# Patient Record
Sex: FEMALE | Race: WHITE | NOT HISPANIC OR LATINO | URBAN - METROPOLITAN AREA
[De-identification: names, ages, dates, MRNs, and addresses within clinical notes are randomized per-mention and may not be internally consistent; named-entity substitution may affect disease eponyms.]

---

## 2019-12-27 ENCOUNTER — HOSPITAL ENCOUNTER (EMERGENCY)
Facility: HOSPITAL | Age: 34
Discharge: LEFT BEFORE DISCHARGE | End: 2019-12-27

## 2023-05-09 ENCOUNTER — OFFICE VISIT (OUTPATIENT)
Dept: INTERNAL MEDICINE | Facility: CLINIC | Age: 38
End: 2023-05-09
Payer: COMMERCIAL

## 2023-05-09 VITALS
BODY MASS INDEX: 31.8 KG/M2 | HEART RATE: 82 BPM | HEIGHT: 63 IN | DIASTOLIC BLOOD PRESSURE: 76 MMHG | SYSTOLIC BLOOD PRESSURE: 136 MMHG | TEMPERATURE: 97.5 F | OXYGEN SATURATION: 94 % | WEIGHT: 179.5 LBS

## 2023-05-09 DIAGNOSIS — Z13.220 SCREENING FOR LIPID DISORDERS: ICD-10-CM

## 2023-05-09 DIAGNOSIS — E66.9 CLASS 1 OBESITY WITH SERIOUS COMORBIDITY AND BODY MASS INDEX (BMI) OF 31.0 TO 31.9 IN ADULT, UNSPECIFIED OBESITY TYPE: ICD-10-CM

## 2023-05-09 DIAGNOSIS — F33.0 MILD EPISODE OF RECURRENT MAJOR DEPRESSIVE DISORDER: Primary | ICD-10-CM

## 2023-05-09 DIAGNOSIS — F41.1 GAD (GENERALIZED ANXIETY DISORDER): ICD-10-CM

## 2023-05-09 DIAGNOSIS — R60.0 LOWER EXTREMITY EDEMA: ICD-10-CM

## 2023-05-09 DIAGNOSIS — Z11.59 NEED FOR HEPATITIS C SCREENING TEST: ICD-10-CM

## 2023-05-09 LAB
ALBUMIN SERPL-MCNC: 4.4 G/DL (ref 3.5–5.2)
ALBUMIN/GLOB SERPL: 1.6 G/DL
ALP SERPL-CCNC: 60 U/L (ref 39–117)
ALT SERPL W P-5'-P-CCNC: 13 U/L (ref 1–33)
ANION GAP SERPL CALCULATED.3IONS-SCNC: 8.8 MMOL/L (ref 5–15)
AST SERPL-CCNC: 14 U/L (ref 1–32)
BASOPHILS # BLD AUTO: 0.05 10*3/MM3 (ref 0–0.2)
BASOPHILS NFR BLD AUTO: 0.7 % (ref 0–1.5)
BILIRUB SERPL-MCNC: <0.2 MG/DL (ref 0–1.2)
BUN SERPL-MCNC: 16 MG/DL (ref 6–20)
BUN/CREAT SERPL: 29.1 (ref 7–25)
CALCIUM SPEC-SCNC: 9.9 MG/DL (ref 8.6–10.5)
CHLORIDE SERPL-SCNC: 108 MMOL/L (ref 98–107)
CHOLEST SERPL-MCNC: 140 MG/DL (ref 0–200)
CO2 SERPL-SCNC: 24.2 MMOL/L (ref 22–29)
CREAT SERPL-MCNC: 0.55 MG/DL (ref 0.57–1)
DEPRECATED RDW RBC AUTO: 43.1 FL (ref 37–54)
EGFRCR SERPLBLD CKD-EPI 2021: 121.2 ML/MIN/1.73
EOSINOPHIL # BLD AUTO: 0.47 10*3/MM3 (ref 0–0.4)
EOSINOPHIL NFR BLD AUTO: 6.8 % (ref 0.3–6.2)
ERYTHROCYTE [DISTWIDTH] IN BLOOD BY AUTOMATED COUNT: 12.7 % (ref 12.3–15.4)
GLOBULIN UR ELPH-MCNC: 2.7 GM/DL
GLUCOSE SERPL-MCNC: 94 MG/DL (ref 65–99)
HCT VFR BLD AUTO: 36.4 % (ref 34–46.6)
HDLC SERPL-MCNC: 44 MG/DL (ref 40–60)
HGB BLD-MCNC: 11.9 G/DL (ref 12–15.9)
IMM GRANULOCYTES # BLD AUTO: 0.02 10*3/MM3 (ref 0–0.05)
IMM GRANULOCYTES NFR BLD AUTO: 0.3 % (ref 0–0.5)
LDLC SERPL CALC-MCNC: 83 MG/DL (ref 0–100)
LDLC/HDLC SERPL: 1.9 {RATIO}
LYMPHOCYTES # BLD AUTO: 2.79 10*3/MM3 (ref 0.7–3.1)
LYMPHOCYTES NFR BLD AUTO: 40.6 % (ref 19.6–45.3)
MCH RBC QN AUTO: 30.2 PG (ref 26.6–33)
MCHC RBC AUTO-ENTMCNC: 32.7 G/DL (ref 31.5–35.7)
MCV RBC AUTO: 92.4 FL (ref 79–97)
MONOCYTES # BLD AUTO: 0.41 10*3/MM3 (ref 0.1–0.9)
MONOCYTES NFR BLD AUTO: 6 % (ref 5–12)
NEUTROPHILS NFR BLD AUTO: 3.14 10*3/MM3 (ref 1.7–7)
NEUTROPHILS NFR BLD AUTO: 45.6 % (ref 42.7–76)
NRBC BLD AUTO-RTO: 0 /100 WBC (ref 0–0.2)
PLATELET # BLD AUTO: 249 10*3/MM3 (ref 140–450)
PMV BLD AUTO: 10.8 FL (ref 6–12)
POTASSIUM SERPL-SCNC: 4.3 MMOL/L (ref 3.5–5.2)
PROT SERPL-MCNC: 7.1 G/DL (ref 6–8.5)
RBC # BLD AUTO: 3.94 10*6/MM3 (ref 3.77–5.28)
SODIUM SERPL-SCNC: 141 MMOL/L (ref 136–145)
TRIGL SERPL-MCNC: 62 MG/DL (ref 0–150)
TSH SERPL DL<=0.05 MIU/L-ACNC: 3.46 UIU/ML (ref 0.27–4.2)
VLDLC SERPL-MCNC: 13 MG/DL (ref 5–40)
WBC NRBC COR # BLD: 6.88 10*3/MM3 (ref 3.4–10.8)

## 2023-05-09 PROCEDURE — 80053 COMPREHEN METABOLIC PANEL: CPT | Performed by: NURSE PRACTITIONER

## 2023-05-09 PROCEDURE — 85025 COMPLETE CBC W/AUTO DIFF WBC: CPT | Performed by: NURSE PRACTITIONER

## 2023-05-09 PROCEDURE — 84443 ASSAY THYROID STIM HORMONE: CPT | Performed by: NURSE PRACTITIONER

## 2023-05-09 PROCEDURE — 80061 LIPID PANEL: CPT | Performed by: NURSE PRACTITIONER

## 2023-05-09 PROCEDURE — 86803 HEPATITIS C AB TEST: CPT | Performed by: NURSE PRACTITIONER

## 2023-05-09 RX ORDER — BUPRENORPHINE HYDROCHLORIDE AND NALOXONE HYDROCHLORIDE DIHYDRATE 8; 2 MG/1; MG/1
2 TABLET SUBLINGUAL DAILY
COMMUNITY
Start: 2023-04-28

## 2023-05-09 RX ORDER — FLUOXETINE HYDROCHLORIDE 40 MG/1
40 CAPSULE ORAL DAILY
COMMUNITY
Start: 2023-04-03

## 2023-05-09 RX ORDER — BUSPIRONE HYDROCHLORIDE 5 MG/1
5 TABLET ORAL 3 TIMES DAILY
Qty: 90 TABLET | Refills: 1 | Status: SHIPPED | OUTPATIENT
Start: 2023-05-09

## 2023-05-09 RX ORDER — HYDROCHLOROTHIAZIDE 25 MG/1
25 TABLET ORAL DAILY
Qty: 90 TABLET | Refills: 1 | Status: SHIPPED | OUTPATIENT
Start: 2023-05-09

## 2023-05-09 NOTE — PROGRESS NOTES
Chief Complaint  Establish Care, Leg Swelling (Has had varicose veins show up, has had US for possible DVT prior, has gotten worse), Weight Loss, Nicotine Dependence (Wanting to stop smoking, has tried patches with no relief ), Depression, and Anxiety (Has had bad postpartum )    Subjective          Nazia Romero presents to [facility]  Obesity  This is a chronic problem. Pertinent negatives include no abdominal pain, anorexia, arthralgias, change in bowel habit, chest pain, chills, congestion, coughing, diaphoresis, fatigue, fever, headaches, joint swelling, myalgias, nausea, neck pain, numbness, rash, sore throat, swollen glands, urinary symptoms, vertigo, visual change, vomiting or weakness.   Depression  Visit Type: initial  Patient presents with the following symptoms: decreased concentration, depressed mood, excessive worry, fatigue, feelings of hopelessness, insomnia, irritability and nervousness/anxiety.  Patient is not experiencing: anhedonia, chest pain, choking sensation, compulsions, confusion, dizziness, dry mouth, feelings of worthlessness, hypersomnia, hyperventilation, impotence, malaise, memory impairment, muscle tension, nausea, obsessions, palpitations, panic, psychomotor agitation, psychomotor retardation, restlessness, shortness of breath, suicidal ideas, suicidal planning, thoughts of death, weight gain and weight loss.    Anxiety  Presents for initial visit. Symptoms include decreased concentration, depressed mood, excessive worry, insomnia, irritability and nervous/anxious behavior. Patient reports no chest pain, compulsions, confusion, dizziness, dry mouth, feeling of choking, hyperventilation, impotence, malaise, muscle tension, nausea, obsessions, palpitations, panic, restlessness, shortness of breath or suicidal ideas.     Her past medical history is significant for depression.   Nicotine Dependence  Symptoms include decreased  "concentration, insomnia and irritability. Symptoms are negative for fatigue and sore throat.       Previous PCP: [facility], [facility]  Specialist(s): none currently   COVID vaccine: completed first dose  Pneumonia vaccine: not of age  Shingles vaccine: not of age  Colon cancer screening: never, no FHx of colon cancer  Mammogram: never, does have FHx of breast cancer, distant cousin on grandmothers side  Pap Smear: had one about 6 months ago    2.5 years.   Clean - pain pills. Oxycodone.   Am in [place]. Dr. Guerra     1-1.5 ppd 15 years           Current Outpatient Medications   Medication Instructions   • buprenorphine-naloxone (SUBOXONE) 8-2 MG per SL tablet 2 tablets, Sublingual, Daily   • busPIRone (BUSPAR) 5 mg, Oral, 3 Times Daily   • FLUoxetine (PROZAC) 40 mg, Oral, Daily   • hydroCHLOROthiazide (HYDRODIURIL) 25 mg, Oral, Daily       The following portions of the patient's history were reviewed and updated as appropriate: allergies, current medications, past family history, past medical history, past social history, past surgical history, and problem list.    Objective   Vital Signs:   /76 (BP Location: Right arm, Patient Position: Sitting, Cuff Size: Adult)   Pulse 82   Temp 97.5 °F (36.4 °C) (Temporal)   Ht 160 cm (63\")   Wt 81.4 kg (179 lb 8 oz)   SpO2 94%   BMI 31.80 kg/m²     Wt Readings from Last 3 Encounters:   05/09/23 81.4 kg (179 lb 8 oz)     BP Readings from Last 3 Encounters:   05/09/23 136/76     Physical Exam  Constitutional:       Appearance: She is obese.        Appearance: No acute distress, well-nourished  Head: normocephalic, atraumatic  Eyes: extraocular movements intact, no scleral icterus, no conjunctival injection  Ears, Nose, and Throat: external ears normal, nares patent, moist mucous membranes  Cardiovascular: regular rate and rhythm. no murmurs, rubs, or gallops. no edema  Respiratory: breathing comfortably, symmetric chest rise, clear to auscultation " bilaterally. No wheezes, rales, or rhonchi.  Neuro: alert and oriented to time, place, and person. Normal gait  Psych: normal mood and affect     Result Review :   The following data was reviewed by: TERA Mcmahan on 05/09/2023:  Common labs        11/10/2022    01:30 3/30/2023    15:47 5/9/2023    10:11   Common Labs   Glucose   94     BUN   16     Creatinine   0.55     Sodium   141     Potassium   4.3     Chloride   108     Calcium   9.9     Albumin   4.4     Total Bilirubin   <0.2     Alkaline Phosphatase   60     AST (SGOT)   14     ALT (SGPT)   13     WBC 8.44      6.09      6.88     Hemoglobin 11.7      11.6      11.9     Hematocrit 35.7      36.0      36.4     Platelets 265      227      249     Total Cholesterol   140     Triglycerides   62     HDL Cholesterol   44     LDL Cholesterol    83         This result is from an external source.            Lab Results   Component Value Date    STREPAAG NEGATIVE CULTURE TO FOLLOW 01/27/2017    INR 0.9 11/10/2022    BILIRUBINUR NEGATIVE 01/27/2017       Procedures        Assessment and Plan    Diagnoses and all orders for this visit:    1. Mild episode of recurrent major depressive disorder (Primary)  -     busPIRone (BUSPAR) 5 MG tablet; Take 1 tablet by mouth 3 (Three) Times a Day.  Dispense: 90 tablet; Refill: 1  -     TSH Rfx On Abnormal To Free T4  -     Comprehensive Metabolic Panel  -     CBC & Differential    2. NATALI (generalized anxiety disorder)  -     busPIRone (BUSPAR) 5 MG tablet; Take 1 tablet by mouth 3 (Three) Times a Day.  Dispense: 90 tablet; Refill: 1  -     TSH Rfx On Abnormal To Free T4  -     Comprehensive Metabolic Panel  -     CBC & Differential    3. Screening for lipid disorders  -     Lipid Panel    4. Need for hepatitis C screening test  -     HCV Antibody Rfx To Qnt PCR  -     Interpretation:    5. Lower extremity edema  -     hydroCHLOROthiazide (HYDRODIURIL) 25 MG tablet; Take 1 tablet by mouth Daily.  Dispense: 90 tablet;  Refill: 1    6. Class 1 obesity with serious comorbidity and body mass index (BMI) of 31.0 to 31.9 in adult, unspecified obesity type          There are no discontinued medications.       Follow Up   Return in about 4 weeks (around 6/6/2023) for Anxiety.  Patient was given instructions and counseling regarding her condition or for health maintenance advice. Please see specific information pulled into the AVS if appropriate.       Génesis Tapia, TERA  05/12/23  15:14 EDT

## 2023-05-11 LAB
HCV AB SERPL QL IA: NORMAL
HCV IGG SERPL QL IA: NON REACTIVE

## 2023-05-12 PROBLEM — F41.1 GAD (GENERALIZED ANXIETY DISORDER): Status: ACTIVE | Noted: 2023-05-12

## 2023-05-12 PROBLEM — E66.9 CLASS 1 OBESITY WITH SERIOUS COMORBIDITY AND BODY MASS INDEX (BMI) OF 31.0 TO 31.9 IN ADULT: Status: ACTIVE | Noted: 2023-05-12

## 2023-05-12 PROBLEM — R60.0 LOWER EXTREMITY EDEMA: Status: ACTIVE | Noted: 2023-05-12

## 2023-05-12 PROBLEM — F33.0 MILD EPISODE OF RECURRENT MAJOR DEPRESSIVE DISORDER: Status: ACTIVE | Noted: 2023-05-12

## 2023-05-12 PROBLEM — E66.811 CLASS 1 OBESITY WITH SERIOUS COMORBIDITY AND BODY MASS INDEX (BMI) OF 31.0 TO 31.9 IN ADULT: Status: ACTIVE | Noted: 2023-05-12

## 2023-05-12 NOTE — ASSESSMENT & PLAN NOTE
Patient's depression is single episode and is mild without psychosis. Their depression is currently active and the condition is newly identified. This will be reassessed in 4 weeks. F/U as described:patient was prescribed an antidepressant medicine.

## 2023-05-12 NOTE — ASSESSMENT & PLAN NOTE
Psychological condition is newly identified.  Regular aerobic exercise.  Medication changes per orders.  Psychological condition  will be reassessed in 4 weeks.

## 2023-05-12 NOTE — ASSESSMENT & PLAN NOTE
Patient's (Body mass index is 31.8 kg/m².) indicates that they are obese (BMI >30) with health conditions that include none . Weight is newly identified. BMI  is above average; BMI management plan is completed. We discussed low calorie, low carb based diet program, portion control and increasing exercise.

## 2023-05-16 ENCOUNTER — TELEPHONE (OUTPATIENT)
Dept: INTERNAL MEDICINE | Facility: CLINIC | Age: 38
End: 2023-05-16
Payer: COMMERCIAL

## 2023-05-16 NOTE — TELEPHONE ENCOUNTER
----- Message from TERA Mcmahan sent at 5/16/2023 12:48 PM EDT -----  Slight anemia noted.   Would like to see iron drawn. Placed outpatient visit.

## 2023-05-23 ENCOUNTER — OFFICE VISIT (OUTPATIENT)
Dept: INTERNAL MEDICINE | Facility: CLINIC | Age: 38
End: 2023-05-23
Payer: COMMERCIAL

## 2023-05-23 VITALS
HEIGHT: 63 IN | BODY MASS INDEX: 31.36 KG/M2 | DIASTOLIC BLOOD PRESSURE: 85 MMHG | SYSTOLIC BLOOD PRESSURE: 138 MMHG | WEIGHT: 177 LBS | OXYGEN SATURATION: 97 % | TEMPERATURE: 98.7 F | HEART RATE: 75 BPM

## 2023-05-23 DIAGNOSIS — J01.00 ACUTE NON-RECURRENT MAXILLARY SINUSITIS: Primary | ICD-10-CM

## 2023-05-23 PROCEDURE — 1159F MED LIST DOCD IN RCRD: CPT | Performed by: INTERNAL MEDICINE

## 2023-05-23 PROCEDURE — 1160F RVW MEDS BY RX/DR IN RCRD: CPT | Performed by: INTERNAL MEDICINE

## 2023-05-23 PROCEDURE — 99213 OFFICE O/P EST LOW 20 MIN: CPT | Performed by: INTERNAL MEDICINE

## 2023-05-23 RX ORDER — PREDNISONE 20 MG/1
40 TABLET ORAL DAILY
Qty: 10 TABLET | Refills: 0 | Status: SHIPPED | OUTPATIENT
Start: 2023-05-23 | End: 2023-05-28

## 2023-05-23 RX ORDER — DOXYCYCLINE HYCLATE 100 MG/1
100 CAPSULE ORAL 2 TIMES DAILY
Qty: 14 CAPSULE | Refills: 0 | Status: SHIPPED | OUTPATIENT
Start: 2023-05-23 | End: 2023-05-30

## 2023-05-23 RX ORDER — POLYETHYLENE GLYCOL 3350 17 G/DOSE
POWDER (GRAM) ORAL
COMMUNITY
Start: 2023-05-11

## 2023-05-23 NOTE — PROGRESS NOTES
"Chief Complaint  Eye Drainage (Wake up in the morning swelling, and a lot of drainage /Feels like eyes are watering )    Subjective          Nazia Romero presents to [facility]  History of Present Illness  Patient with cough, congestion, rhinorrhea, myalgia. Patient denies fevers, shortness of breath, CP. Patient with eye drainage that developed last 3-4 days as well. Patient without vomiting and diarrhea. She reports her children were sick a few weeks ago.  Patient reports sick with strep approx 2 months ago.    Current Outpatient Medications   Medication Instructions   • buprenorphine-naloxone (SUBOXONE) 8-2 MG per SL tablet 2 tablets, Sublingual, Daily   • busPIRone (BUSPAR) 5 mg, Oral, 3 Times Daily   • [employer] Purelax 17 GM/SCOOP powder DISSOLVE 17 GRAMS INTO APPROPRIATE LIQUID AND DRINK BY MOUTH DAILY   • doxycycline (VIBRAMYCIN) 100 mg, Oral, 2 Times Daily   • FLUoxetine (PROZAC) 40 mg, Oral, Daily   • hydroCHLOROthiazide (HYDRODIURIL) 25 mg, Oral, Daily   • predniSONE (DELTASONE) 40 mg, Oral, Daily       The following portions of the patient's history were reviewed and updated as appropriate: allergies, current medications, past family history, past medical history, past social history, past surgical history, and problem list.    Objective   Vital Signs:   /85 (BP Location: Left arm)   Pulse 75   Temp 98.7 °F (37.1 °C) (Temporal)   Ht 160 cm (63\")   Wt 80.3 kg (177 lb)   SpO2 97%   BMI 31.35 kg/m²     Wt Readings from Last 3 Encounters:   05/23/23 80.3 kg (177 lb)   05/09/23 81.4 kg (179 lb 8 oz)     BP Readings from Last 3 Encounters:   05/23/23 138/85   05/09/23 136/76     Physical Exam   Appearance: No acute distress, well-nourished  Head: normocephalic, atraumatic  Eyes: extraocular movements intact, no scleral icterus, no conjunctival injection  Ears, Nose, and Throat: external ears normal, nares patent, moist mucous membranes. TMs with effusion " remington.   Cardiovascular: regular rate and rhythm. no murmurs, rubs, or gallops. no edema  Respiratory: breathing comfortably, symmetric chest rise, clear to auscultation bilaterally. No wheezes, rales, or rhonchi.  Neuro: alert and oriented to time, place, and person. Normal gait  Psych: normal mood and affect     Result Review :   The following data was reviewed by: Prudencio Layne Jr, MD on 05/23/2023:  Common labs        11/10/2022    01:30 3/30/2023    15:47 5/9/2023    10:11   Common Labs   Glucose   94     BUN   16     Creatinine   0.55     Sodium   141     Potassium   4.3     Chloride   108     Calcium   9.9     Albumin   4.4     Total Bilirubin   <0.2     Alkaline Phosphatase   60     AST (SGOT)   14     ALT (SGPT)   13     WBC 8.44      6.09      6.88     Hemoglobin 11.7      11.6      11.9     Hematocrit 35.7      36.0      36.4     Platelets 265      227      249     Total Cholesterol   140     Triglycerides   62     HDL Cholesterol   44     LDL Cholesterol    83         This result is from an external source.       Lab Results   Component Value Date    STREPAAG NEGATIVE CULTURE TO FOLLOW 01/27/2017    INR 0.9 11/10/2022    BILIRUBINUR NEGATIVE 01/27/2017        Assessment and Plan    Diagnoses and all orders for this visit:    1. Acute non-recurrent maxillary sinusitis (Primary)  -     doxycycline (VIBRAMYCIN) 100 MG capsule; Take 1 capsule by mouth 2 (Two) Times a Day for 7 days.  Dispense: 14 capsule; Refill: 0  -     predniSONE (DELTASONE) 20 MG tablet; Take 2 tablets by mouth Daily for 5 days.  Dispense: 10 tablet; Refill: 0          There are no discontinued medications.     Follow Up   Return if symptoms worsen or fail to improve.  Patient was given instructions and counseling regarding her condition or for health maintenance advice. Please see specific information pulled into the AVS if appropriate.       Prudencio Layne Jr, MD  05/23/23  12:50 EDT

## 2023-08-16 ENCOUNTER — OFFICE VISIT (OUTPATIENT)
Dept: INTERNAL MEDICINE | Facility: CLINIC | Age: 38
End: 2023-08-16
Payer: COMMERCIAL

## 2023-08-16 VITALS
HEIGHT: 63 IN | RESPIRATION RATE: 18 BRPM | DIASTOLIC BLOOD PRESSURE: 91 MMHG | OXYGEN SATURATION: 96 % | SYSTOLIC BLOOD PRESSURE: 135 MMHG | BODY MASS INDEX: 31.47 KG/M2 | WEIGHT: 177.6 LBS | TEMPERATURE: 98.4 F | HEART RATE: 82 BPM

## 2023-08-16 DIAGNOSIS — Z13.220 SCREENING FOR LIPID DISORDERS: ICD-10-CM

## 2023-08-16 DIAGNOSIS — D64.9 ANEMIA, UNSPECIFIED TYPE: ICD-10-CM

## 2023-08-16 DIAGNOSIS — E66.9 CLASS 1 OBESITY WITHOUT SERIOUS COMORBIDITY WITH BODY MASS INDEX (BMI) OF 31.0 TO 31.9 IN ADULT, UNSPECIFIED OBESITY TYPE: ICD-10-CM

## 2023-08-16 DIAGNOSIS — B49 FUNGAL INFECTION: ICD-10-CM

## 2023-08-16 DIAGNOSIS — F33.0 MILD EPISODE OF RECURRENT MAJOR DEPRESSIVE DISORDER: ICD-10-CM

## 2023-08-16 DIAGNOSIS — F41.1 GAD (GENERALIZED ANXIETY DISORDER): ICD-10-CM

## 2023-08-16 DIAGNOSIS — R60.0 LOWER EXTREMITY EDEMA: Primary | ICD-10-CM

## 2023-08-16 LAB
ALBUMIN SERPL-MCNC: 4.2 G/DL (ref 3.5–5.2)
ALBUMIN/GLOB SERPL: 1.8 G/DL
ALP SERPL-CCNC: 62 U/L (ref 39–117)
ALT SERPL W P-5'-P-CCNC: 10 U/L (ref 1–33)
ANION GAP SERPL CALCULATED.3IONS-SCNC: 11.9 MMOL/L (ref 5–15)
AST SERPL-CCNC: 18 U/L (ref 1–32)
BASOPHILS # BLD AUTO: 0.03 10*3/MM3 (ref 0–0.2)
BASOPHILS NFR BLD AUTO: 0.5 % (ref 0–1.5)
BILIRUB SERPL-MCNC: <0.2 MG/DL (ref 0–1.2)
BUN SERPL-MCNC: 19 MG/DL (ref 6–20)
BUN/CREAT SERPL: 32.8 (ref 7–25)
CALCIUM SPEC-SCNC: 9.2 MG/DL (ref 8.6–10.5)
CHLORIDE SERPL-SCNC: 107 MMOL/L (ref 98–107)
CHOLEST SERPL-MCNC: 127 MG/DL (ref 0–200)
CO2 SERPL-SCNC: 24.1 MMOL/L (ref 22–29)
CREAT SERPL-MCNC: 0.58 MG/DL (ref 0.57–1)
DEPRECATED RDW RBC AUTO: 41.7 FL (ref 37–54)
EGFRCR SERPLBLD CKD-EPI 2021: 119.7 ML/MIN/1.73
EOSINOPHIL # BLD AUTO: 0.21 10*3/MM3 (ref 0–0.4)
EOSINOPHIL NFR BLD AUTO: 3.3 % (ref 0.3–6.2)
ERYTHROCYTE [DISTWIDTH] IN BLOOD BY AUTOMATED COUNT: 12 % (ref 12.3–15.4)
GLOBULIN UR ELPH-MCNC: 2.3 GM/DL
GLUCOSE SERPL-MCNC: 64 MG/DL (ref 65–99)
HCT VFR BLD AUTO: 35.7 % (ref 34–46.6)
HDLC SERPL-MCNC: 42 MG/DL (ref 40–60)
HGB BLD-MCNC: 12 G/DL (ref 12–15.9)
IMM GRANULOCYTES # BLD AUTO: 0.02 10*3/MM3 (ref 0–0.05)
IMM GRANULOCYTES NFR BLD AUTO: 0.3 % (ref 0–0.5)
IRON 24H UR-MRATE: 45 MCG/DL (ref 37–145)
IRON SATN MFR SERPL: 13 % (ref 20–50)
LDLC SERPL CALC-MCNC: 66 MG/DL (ref 0–100)
LDLC/HDLC SERPL: 1.54 {RATIO}
LYMPHOCYTES # BLD AUTO: 2.06 10*3/MM3 (ref 0.7–3.1)
LYMPHOCYTES NFR BLD AUTO: 32.7 % (ref 19.6–45.3)
MCH RBC QN AUTO: 32 PG (ref 26.6–33)
MCHC RBC AUTO-ENTMCNC: 33.6 G/DL (ref 31.5–35.7)
MCV RBC AUTO: 95.2 FL (ref 79–97)
MONOCYTES # BLD AUTO: 0.31 10*3/MM3 (ref 0.1–0.9)
MONOCYTES NFR BLD AUTO: 4.9 % (ref 5–12)
NEUTROPHILS NFR BLD AUTO: 3.67 10*3/MM3 (ref 1.7–7)
NEUTROPHILS NFR BLD AUTO: 58.3 % (ref 42.7–76)
NRBC BLD AUTO-RTO: 0 /100 WBC (ref 0–0.2)
PLATELET # BLD AUTO: 257 10*3/MM3 (ref 140–450)
PMV BLD AUTO: 10.6 FL (ref 6–12)
POTASSIUM SERPL-SCNC: 4.2 MMOL/L (ref 3.5–5.2)
PROT SERPL-MCNC: 6.5 G/DL (ref 6–8.5)
RBC # BLD AUTO: 3.75 10*6/MM3 (ref 3.77–5.28)
SODIUM SERPL-SCNC: 143 MMOL/L (ref 136–145)
TIBC SERPL-MCNC: 347 MCG/DL (ref 298–536)
TRANSFERRIN SERPL-MCNC: 233 MG/DL (ref 200–360)
TRIGL SERPL-MCNC: 102 MG/DL (ref 0–150)
TSH SERPL DL<=0.05 MIU/L-ACNC: 1.23 UIU/ML (ref 0.27–4.2)
VLDLC SERPL-MCNC: 19 MG/DL (ref 5–40)
WBC NRBC COR # BLD: 6.3 10*3/MM3 (ref 3.4–10.8)

## 2023-08-16 PROCEDURE — 1160F RVW MEDS BY RX/DR IN RCRD: CPT | Performed by: NURSE PRACTITIONER

## 2023-08-16 PROCEDURE — 85025 COMPLETE CBC W/AUTO DIFF WBC: CPT | Performed by: NURSE PRACTITIONER

## 2023-08-16 PROCEDURE — 80053 COMPREHEN METABOLIC PANEL: CPT | Performed by: NURSE PRACTITIONER

## 2023-08-16 PROCEDURE — 84443 ASSAY THYROID STIM HORMONE: CPT | Performed by: NURSE PRACTITIONER

## 2023-08-16 PROCEDURE — 1159F MED LIST DOCD IN RCRD: CPT | Performed by: NURSE PRACTITIONER

## 2023-08-16 PROCEDURE — 80061 LIPID PANEL: CPT | Performed by: NURSE PRACTITIONER

## 2023-08-16 PROCEDURE — 99214 OFFICE O/P EST MOD 30 MIN: CPT | Performed by: NURSE PRACTITIONER

## 2023-08-16 RX ORDER — BUPROPION HYDROCHLORIDE 150 MG/1
1 TABLET ORAL DAILY
COMMUNITY
Start: 2023-08-01

## 2023-08-16 NOTE — PROGRESS NOTES
Chief Complaint  Anxiety and Depression      Subjective          Nazia Romero presents to [facility]  History of Present Illness  Obesity  This is a chronic problem. Pertinent negatives include no abdominal pain, anorexia, arthralgias, change in bowel habit, chest pain, chills, congestion, coughing, diaphoresis, fatigue, fever, headaches, joint swelling, myalgias, nausea, neck pain, numbness, rash, sore throat, swollen glands, urinary symptoms, vertigo, visual change, vomiting or weakness.   Depression  Visit Type: follow-up   Patient presents with the following symptoms: decreased concentration, depressed mood, excessive worry, fatigue, feelings of hopelessness, insomnia, irritability and nervousness/anxiety.  Patient is not experiencing: anhedonia, chest pain, choking sensation, compulsions, confusion, dizziness, dry mouth, feelings of worthlessness, hypersomnia, hyperventilation, impotence, malaise, memory impairment, muscle tension, nausea, obsessions, palpitations, panic, psychomotor agitation, psychomotor retardation, restlessness, shortness of breath, suicidal ideas, suicidal planning, thoughts of death, weight gain and weight loss.    Anxiety  Presents for follow-up visit. Symptoms include decreased concentration, depressed mood, excessive worry, insomnia, irritability and nervous/anxious behavior. Patient reports no chest pain, compulsions, confusion, dizziness, dry mouth, feeling of choking, hyperventilation, impotence, malaise, muscle tension, nausea, obsessions, palpitations, panic, restlessness, shortness of breath or suicidal ideas.     Her past medical history is significant for depression.   Nicotine Dependence  Symptoms include decreased concentration, insomnia and irritability. Symptoms are negative for fatigue and sore throat.     Prozac, buspar, wellbutrin, lexapro, zoloft,     Current Outpatient Medications   Medication Instructions     "buprenorphine-naloxone (SUBOXONE) 8-2 MG per SL tablet 2 tablets, Sublingual, Daily    buPROPion XL (WELLBUTRIN XL) 150 MG 24 hr tablet 1 tablet, Oral, Daily    ciclopirox (Penlac) 8 % solution Topical, Nightly    Clotrimazole 1 % ointment 1 application , Apply externally, 2 Times Daily    [employer] Purelax 17 GM/SCOOP powder DISSOLVE 17 GRAMS INTO APPROPRIATE LIQUID AND DRINK BY MOUTH DAILY    FLUoxetine (PROZAC) 40 mg, Oral, Daily    phentermine 37.5 mg, Oral, Every Morning       The following portions of the patient's history were reviewed and updated as appropriate: allergies, current medications, past family history, past medical history, past social history, past surgical history, and problem list.    Objective   Vital Signs:   /91 (BP Location: Right arm, Patient Position: Sitting, Cuff Size: Adult)   Pulse 82   Temp 98.4 øF (36.9 øC) (Temporal)   Resp 18   Ht 160 cm (63\")   Wt 80.6 kg (177 lb 9.6 oz)   SpO2 96%   BMI 31.46 kg/mý     BP Readings from Last 3 Encounters:   08/16/23 135/91   05/23/23 138/85   05/09/23 136/76     Wt Readings from Last 3 Encounters:   08/16/23 80.6 kg (177 lb 9.6 oz)   05/23/23 80.3 kg (177 lb)   05/09/23 81.4 kg (179 lb 8 oz)           Physical Exam     Appearance: No acute distress, well-nourished  Head: normocephalic, atraumatic  Eyes: extraocular movements intact, no scleral icterus, no conjunctival injection  Ears, Nose, and Throat: external ears normal, nares patent, moist mucous membranes  Cardiovascular: regular rate and rhythm. no murmurs, rubs, or gallops. no edema  Respiratory: breathing comfortably, symmetric chest rise, clear to auscultation bilaterally. No wheezes, rales, or rhonchi.  Neuro: alert and oriented to time, place, and person. Normal gait  Psych: normal mood and affect     Result Review :   The following data was reviewed by: TERA Mcmahan on 08/16/2023:  Common labs          3/30/2023    15:47 5/9/2023    10:11 8/16/2023    17:04 "   Common Labs   Glucose  94  64    BUN  16  19    Creatinine  0.55  0.58    Sodium  141  143    Potassium  4.3  4.2    Chloride  108  107    Calcium  9.9  9.2    Albumin  4.4  4.2    Total Bilirubin  <0.2  <0.2    Alkaline Phosphatase  60  62    AST (SGOT)  14  18    ALT (SGPT)  13  10    WBC 6.09     6.88  6.30    Hemoglobin 11.6     11.9  12.0    Hematocrit 36.0     36.4  35.7    Platelets 227     249  257    Total Cholesterol  140  127    Triglycerides  62  102    HDL Cholesterol  44  42    LDL Cholesterol   83  66       Details          This result is from an external source.                    Lab Results   Component Value Date    STREPAAG NEGATIVE CULTURE TO FOLLOW 01/27/2017    INR 0.9 11/10/2022    BILIRUBINUR NEGATIVE 01/27/2017       Procedures        Assessment and Plan    Diagnoses and all orders for this visit:    1. Lower extremity edema (Primary)  -     Miscellaneous DME    2. Mild episode of recurrent major depressive disorder  -     [facility] - Swab,; Future  -     TSH Rfx On Abnormal To Free T4  -     Comprehensive Metabolic Panel  -     CBC & Differential  -     [facility] - Swab,    3. NATALI (generalized anxiety disorder)  -     [facility] - Swab,; Future  -     Lipid Panel  -     Comprehensive Metabolic Panel  -     CBC & Differential  -     [facility] - Swab,    4. Fungal infection  -     Discontinue: Clotrimazole 1 % ointment; Apply 1 application  topically 2 (Two) Times a Day.  Dispense: 56.7 g; Refill: 1  -     ciclopirox (Penlac) 8 % solution; Apply  topically to the appropriate area as directed Every Night.  Dispense: 6 mL; Refill: 0  -     Clotrimazole 1 % ointment; Apply 1 application  topically 2 (Two) Times a Day.  Dispense: 56.7 g; Refill: 1    5. Screening for lipid disorders  -     Lipid Panel    6. Anemia, unspecified type  -     Cancel: Iron and TIBC  -     Iron and TIBC    7. Class 1 obesity without serious comorbidity with body mass index (BMI) of 31.0 to 31.9 in adult, unspecified  obesity type  -     Cancel: POC Urine Drug Screen [facility]  -     phentermine 37.5 MG capsule; Take 1 capsule by mouth Every Morning.  Dispense: 30 capsule; Refill: 0      - short course of phentermine no longer than 3 months. Patient aware.   UDS obtained.   Controlled consent obtained.   SADIQ reviewed.   Medications Discontinued During This Encounter   Medication Reason    busPIRone (BUSPAR) 5 MG tablet Historical Med - Therapy completed    hydroCHLOROthiazide (HYDRODIURIL) 25 MG tablet Historical Med - Therapy completed    Clotrimazole 1 % ointment Reorder          Follow Up   Return in about 4 weeks (around 9/13/2023) for Weight, Depression.  Patient was given instructions and counseling regarding her condition or for health maintenance advice. Please see specific information pulled into the AVS if appropriate.       Génesis Tapia, TERA  08/18/23  16:38 EDT

## 2023-08-17 ENCOUNTER — TELEPHONE (OUTPATIENT)
Dept: INTERNAL MEDICINE | Facility: CLINIC | Age: 38
End: 2023-08-17
Payer: COMMERCIAL

## 2023-08-17 NOTE — TELEPHONE ENCOUNTER
----- Message from TERA Mcmahan sent at 8/17/2023 11:12 AM EDT -----  Iron is low.  I do recommend daily supplementation.    Other labs reassuring.

## 2023-08-17 NOTE — TELEPHONE ENCOUNTER
Attempted to call patient. No answer. Left message to return call.     HUB OK TO READ/ADVISE  ----- Message from TERA Mcmahan sent at 8/17/2023 11:12 AM EDT -----  Iron is low.  I do recommend daily supplementation.     Other labs reassuring.

## 2023-08-18 ENCOUNTER — CLINICAL SUPPORT (OUTPATIENT)
Dept: INTERNAL MEDICINE | Facility: CLINIC | Age: 38
End: 2023-08-18
Payer: COMMERCIAL

## 2023-08-18 DIAGNOSIS — F41.1 GAD (GENERALIZED ANXIETY DISORDER): Primary | ICD-10-CM

## 2023-08-18 PROCEDURE — 80305 DRUG TEST PRSMV DIR OPT OBS: CPT | Performed by: NURSE PRACTITIONER

## 2023-08-18 RX ORDER — PHENTERMINE HYDROCHLORIDE 37.5 MG/1
37.5 CAPSULE ORAL EVERY MORNING
Qty: 30 CAPSULE | Refills: 0 | Status: SHIPPED | OUTPATIENT
Start: 2023-08-18

## 2023-08-18 NOTE — PROGRESS NOTES
Patient is here to leave urine sample for UDS. Patient states she already signed controlled contract.

## 2023-08-21 ENCOUNTER — TELEPHONE (OUTPATIENT)
Dept: INTERNAL MEDICINE | Facility: CLINIC | Age: 38
End: 2023-08-21
Payer: COMMERCIAL

## 2023-08-21 NOTE — TELEPHONE ENCOUNTER
Caller: Nazia Romero    Relationship: Self    Best call back number: [phone]    Requested Prescriptions:   ALEVAZOL    Pharmacy where request should be sent: [facility]Y #[id number] - [place], [place] - [address] - [phone]  - [phone] FX     Last office visit with prescribing clinician: 8/16/2023   Last telemedicine visit with prescribing clinician: Visit date not found   Next office visit with prescribing clinician: 9/27/2023     Additional details provided by patient: PATIENT WAS PRESCRIBED THIS MEDICATION AND INSURANCE DOES NOT COVER THIS. SHE MENTIONED THIS TO HER ON 08/18/2023 BUT THE SAME MEDICATION WAS TO PHARMACY. SHE IS REQUESTING CALL TO DISCUSS THIS.     Does the patient have less than a 3 day supply:  [] Yes  [x] No    Would you like a call back once the refill request has been completed: [] Yes [x] No    If the office needs to give you a call back, can they leave a voicemail: [] Yes [x] No    Kristofer Moreira Rep   08/21/23 12:46 EDT

## 2023-08-25 ENCOUNTER — TELEPHONE (OUTPATIENT)
Dept: INTERNAL MEDICINE | Facility: CLINIC | Age: 38
End: 2023-08-25
Payer: COMMERCIAL

## 2023-08-25 DIAGNOSIS — F41.1 GAD (GENERALIZED ANXIETY DISORDER): Primary | ICD-10-CM

## 2023-08-25 DIAGNOSIS — F33.0 MILD EPISODE OF RECURRENT MAJOR DEPRESSIVE DISORDER: ICD-10-CM

## 2023-08-25 NOTE — TELEPHONE ENCOUNTER
Called patient in regards to [employer] results. Patient was on Prozac, gave list to patient.    -Wellbutrin  -Celexa  -Pristiq  -Lexapro  -Zoloft  -Effexor  -Trintellix  -Cymbalta    Patient does not have preference to medication.

## 2023-08-27 RX ORDER — FLUOXETINE 10 MG/1
10 CAPSULE ORAL DAILY
Qty: 5 CAPSULE | Refills: 0 | Status: SHIPPED | OUTPATIENT
Start: 2023-08-27

## 2023-08-27 RX ORDER — DULOXETIN HYDROCHLORIDE 20 MG/1
20 CAPSULE, DELAYED RELEASE ORAL DAILY
Qty: 30 CAPSULE | Refills: 1 | Status: SHIPPED | OUTPATIENT
Start: 2023-08-27 | End: 2023-08-27

## 2023-08-27 NOTE — TELEPHONE ENCOUNTER
Wean off of prozac:     20 mg for 5-7 days   10 mg for 5-7 days and then can switch.     I sent cymbalta to the pharmacy

## 2023-08-28 RX ORDER — DULOXETIN HYDROCHLORIDE 20 MG/1
20 CAPSULE, DELAYED RELEASE ORAL DAILY
Qty: 30 CAPSULE | Refills: 1 | Status: SHIPPED | OUTPATIENT
Start: 2023-08-28

## 2023-08-31 DIAGNOSIS — F33.0 MILD EPISODE OF RECURRENT MAJOR DEPRESSIVE DISORDER: ICD-10-CM

## 2023-08-31 DIAGNOSIS — F41.1 GAD (GENERALIZED ANXIETY DISORDER): ICD-10-CM

## 2023-09-01 RX ORDER — BUSPIRONE HYDROCHLORIDE 5 MG/1
TABLET ORAL
Qty: 90 TABLET | Refills: 1 | Status: SHIPPED | OUTPATIENT
Start: 2023-09-01

## 2023-10-04 ENCOUNTER — OFFICE VISIT (OUTPATIENT)
Dept: INTERNAL MEDICINE | Facility: CLINIC | Age: 38
End: 2023-10-04
Payer: COMMERCIAL

## 2023-10-04 VITALS
SYSTOLIC BLOOD PRESSURE: 117 MMHG | WEIGHT: 178 LBS | TEMPERATURE: 97.8 F | HEIGHT: 63 IN | OXYGEN SATURATION: 95 % | DIASTOLIC BLOOD PRESSURE: 83 MMHG | HEART RATE: 82 BPM | BODY MASS INDEX: 31.54 KG/M2

## 2023-10-04 DIAGNOSIS — H92.09 OTALGIA, UNSPECIFIED LATERALITY: ICD-10-CM

## 2023-10-04 DIAGNOSIS — J02.9 SORE THROAT: Primary | ICD-10-CM

## 2023-10-04 DIAGNOSIS — J40 BRONCHITIS: ICD-10-CM

## 2023-10-04 LAB
EXPIRATION DATE: NORMAL
EXPIRATION DATE: NORMAL
FLUAV AG UPPER RESP QL IA.RAPID: NOT DETECTED
FLUBV AG UPPER RESP QL IA.RAPID: NOT DETECTED
INTERNAL CONTROL: NORMAL
INTERNAL CONTROL: NORMAL
Lab: NORMAL
Lab: NORMAL
S PYO AG THROAT QL: NEGATIVE
SARS-COV-2 AG UPPER RESP QL IA.RAPID: NOT DETECTED
SARS-COV-2 RNA RESP QL NAA+PROBE: NOT DETECTED

## 2023-10-04 PROCEDURE — 87635 SARS-COV-2 COVID-19 AMP PRB: CPT | Performed by: STUDENT IN AN ORGANIZED HEALTH CARE EDUCATION/TRAINING PROGRAM

## 2023-10-04 RX ORDER — CEFDINIR 300 MG/1
300 CAPSULE ORAL 2 TIMES DAILY
Qty: 10 CAPSULE | Refills: 0 | Status: SHIPPED | OUTPATIENT
Start: 2023-10-04 | End: 2023-10-09

## 2023-10-04 RX ORDER — PREDNISONE 20 MG/1
40 TABLET ORAL DAILY
Qty: 10 TABLET | Refills: 0 | Status: SHIPPED | OUTPATIENT
Start: 2023-10-04 | End: 2023-10-09

## 2023-10-04 RX ORDER — VARENICLINE TARTRATE 1 MG/1
TABLET, FILM COATED ORAL
COMMUNITY
Start: 2023-10-02

## 2023-10-04 NOTE — PROGRESS NOTES
"Chief Complaint  Sore Throat (Started Sunday ), Cough, and Earache    Subjective          Nazia Romero presents to [facility]  History of Present Illness    Miss Romero is here with 3 days of sick symptoms.  Symptoms include cough productive of green sputum, right ear otalgia, sore throat, and body aches.  She has had no fever, no vomiting or diarrhea.    Current Outpatient Medications   Medication Instructions    buprenorphine-naloxone (SUBOXONE) 8-2 MG per SL tablet 2 tablets, Sublingual, Daily    buPROPion XL (WELLBUTRIN XL) 150 MG 24 hr tablet 1 tablet, Oral, Daily    busPIRone (BUSPAR) 5 MG tablet TAKE 1 TABLET BY MOUTH THREE TIMES A DAY    cefdinir (OMNICEF) 300 mg, Oral, 2 Times Daily    ciclopirox (Penlac) 8 % solution Topical, Nightly    Clotrimazole 1 % ointment 1 application , Apply externally, 2 Times Daily    [employer] Purelax 17 GM/SCOOP powder DISSOLVE 17 GRAMS INTO APPROPRIATE LIQUID AND DRINK BY MOUTH DAILY    DULoxetine (CYMBALTA) 20 mg, Oral, Daily    FLUoxetine (PROZAC) 10 mg, Oral, Daily    phentermine 37.5 mg, Oral, Every Morning    predniSONE (DELTASONE) 40 mg, Oral, Daily    varenicline (CHANTIX) 1 MG tablet        The following portions of the patient's history were reviewed and updated as appropriate: allergies, current medications, past family history, past medical history, past social history, past surgical history, and problem list.    Objective   Vital Signs:   /83 (BP Location: Left arm, Patient Position: Sitting, Cuff Size: Adult)   Pulse 82   Temp 97.8 °F (36.6 °C) (Temporal)   Ht 160 cm (63\")   Wt 80.7 kg (178 lb)   SpO2 95%   BMI 31.53 kg/m²     BP Readings from Last 3 Encounters:   10/04/23 117/83   08/16/23 135/91   05/23/23 138/85     Wt Readings from Last 3 Encounters:   10/04/23 80.7 kg (178 lb)   08/16/23 80.6 kg (177 lb 9.6 oz)   05/23/23 80.3 kg (177 lb)           Physical Exam  Vitals reviewed.   Constitutional:       " General: She is not in acute distress.     Appearance: Normal appearance. She is not ill-appearing, toxic-appearing or diaphoretic.   HENT:      Head: Normocephalic and atraumatic.      Right Ear: Tympanic membrane, ear canal and external ear normal.      Left Ear: Tympanic membrane, ear canal and external ear normal.   Eyes:      Conjunctiva/sclera: Conjunctivae normal.   Cardiovascular:      Rate and Rhythm: Normal rate and regular rhythm.      Pulses: Normal pulses.      Heart sounds: Normal heart sounds. No murmur heard.    No friction rub. No gallop.   Pulmonary:      Effort: Pulmonary effort is normal. No respiratory distress.      Breath sounds: No stridor. Rhonchi present. No wheezing or rales.   Chest:      Chest wall: No tenderness.   Abdominal:      General: Abdomen is flat.      Palpations: Abdomen is soft. There is no mass.      Tenderness: There is no abdominal tenderness.   Musculoskeletal:      Right lower leg: No edema.      Left lower leg: No edema.   Skin:     General: Skin is warm and dry.   Neurological:      General: No focal deficit present.      Mental Status: She is alert. Mental status is at baseline.   Psychiatric:         Mood and Affect: Mood normal.         Behavior: Behavior normal.         Thought Content: Thought content normal.         Judgment: Judgment normal.      Result Review :   The following data was reviewed by: Tho Baron MD on 10/04/2023:  Common labs          3/30/2023    15:47 5/9/2023    10:11 8/16/2023    17:04   Common Labs   Glucose  94  64    BUN  16  19    Creatinine  0.55  0.58    Sodium  141  143    Potassium  4.3  4.2    Chloride  108  107    Calcium  9.9  9.2    Albumin  4.4  4.2    Total Bilirubin  <0.2  <0.2    Alkaline Phosphatase  60  62    AST (SGOT)  14  18    ALT (SGPT)  13  10    WBC 6.09     6.88  6.30    Hemoglobin 11.6     11.9  12.0    Hematocrit 36.0     36.4  35.7    Platelets 227     249  257    Total Cholesterol  140  127    Triglycerides   62  102    HDL Cholesterol  44  42    LDL Cholesterol   83  66       Details          This result is from an external source.                    Lab Results   Component Value Date    SARSANTIGEN Not Detected 10/04/2023    FLUAAG Not Detected 10/04/2023    FLUBAG Not Detected 10/04/2023    RAPSCRN Negative 10/04/2023    STREPAAG NEGATIVE CULTURE TO FOLLOW 01/27/2017    INR 0.9 11/10/2022    BILIRUBINUR NEGATIVE 01/27/2017       Procedures        Assessment and Plan    Diagnoses and all orders for this visit:    1. Sore throat (Primary)  -     POCT SARS-CoV-2 Antigen LEILA  -     POC Rapid Strep A    2. Otalgia, unspecified laterality  -     COVID-19,CEPHEID/MARYSE,COR/KESHIA/PAD/BYRON/MAD IN-HOUSE(OR EMERGENT/ADD-ON),NP SWAB IN TRANSPORT MEDIA 3-4 HR TAT, RT-PCR - Swab, Nasopharynx    3. Bronchitis  -     predniSONE (DELTASONE) 20 MG tablet; Take 2 tablets by mouth Daily for 5 days.  Dispense: 10 tablet; Refill: 0  -     cefdinir (OMNICEF) 300 MG capsule; Take 1 capsule by mouth 2 (Two) Times a Day for 5 days.  Dispense: 10 capsule; Refill: 0          There are no discontinued medications.       Follow Up   Return if symptoms worsen or fail to improve.  Patient was given instructions and counseling regarding her condition or for health maintenance advice. Please see specific information pulled into the AVS if appropriate.       Tho Baron MD  10/04/23  16:48 EDT

## 2023-10-04 NOTE — LETTER
October 4, 2023     Patient: Nazia Romero   YOB: 1985   Date of Visit: 10/4/2023       To Whom It May Concern:    It is my medical opinion that Nazia Romero may return to work on 10/6/23 .           Sincerely,        Tho Baron MD

## 2023-10-05 ENCOUNTER — TELEPHONE (OUTPATIENT)
Dept: INTERNAL MEDICINE | Facility: CLINIC | Age: 38
End: 2023-10-05
Payer: COMMERCIAL

## 2023-10-05 NOTE — TELEPHONE ENCOUNTER
Contacted patient regarding lab results. Patient verified . I informed patient of the below result note. Patient voiced understanding, no further questions.

## 2023-10-05 NOTE — TELEPHONE ENCOUNTER
----- Message from Tho Baron MD sent at 10/5/2023  7:57 AM EDT -----  PCR test for COVID is negative.

## 2023-10-31 DIAGNOSIS — F33.0 MILD EPISODE OF RECURRENT MAJOR DEPRESSIVE DISORDER: ICD-10-CM

## 2023-10-31 DIAGNOSIS — F41.1 GAD (GENERALIZED ANXIETY DISORDER): ICD-10-CM

## 2023-10-31 RX ORDER — BUSPIRONE HYDROCHLORIDE 5 MG/1
TABLET ORAL
Qty: 90 TABLET | Refills: 1 | Status: SHIPPED | OUTPATIENT
Start: 2023-10-31

## 2023-11-07 DIAGNOSIS — R60.0 LOWER EXTREMITY EDEMA: ICD-10-CM

## 2023-11-08 RX ORDER — HYDROCHLOROTHIAZIDE 25 MG/1
25 TABLET ORAL DAILY
Qty: 90 TABLET | Refills: 1 | Status: SHIPPED | OUTPATIENT
Start: 2023-11-08

## 2023-12-24 DIAGNOSIS — F33.0 MILD EPISODE OF RECURRENT MAJOR DEPRESSIVE DISORDER: ICD-10-CM

## 2023-12-24 DIAGNOSIS — F41.1 GAD (GENERALIZED ANXIETY DISORDER): ICD-10-CM

## 2023-12-26 DIAGNOSIS — F33.0 MILD EPISODE OF RECURRENT MAJOR DEPRESSIVE DISORDER: ICD-10-CM

## 2023-12-26 DIAGNOSIS — F41.1 GAD (GENERALIZED ANXIETY DISORDER): ICD-10-CM

## 2023-12-26 RX ORDER — DULOXETIN HYDROCHLORIDE 20 MG/1
20 CAPSULE, DELAYED RELEASE ORAL DAILY
Qty: 30 CAPSULE | Refills: 1 | Status: SHIPPED | OUTPATIENT
Start: 2023-12-26 | End: 2023-12-29

## 2023-12-26 RX ORDER — BUSPIRONE HYDROCHLORIDE 5 MG/1
TABLET ORAL
Qty: 90 TABLET | Refills: 0 | OUTPATIENT
Start: 2023-12-26 | End: 2023-12-29

## 2024-01-10 ENCOUNTER — TELEMEDICINE (OUTPATIENT)
Dept: FAMILY MEDICINE CLINIC | Facility: TELEHEALTH | Age: 39
End: 2024-01-10
Payer: COMMERCIAL

## 2024-01-10 DIAGNOSIS — J03.90 EXUDATIVE TONSILLITIS: Primary | ICD-10-CM

## 2024-01-10 RX ORDER — BUPROPION HYDROCHLORIDE 300 MG/1
1 TABLET ORAL DAILY
COMMUNITY
Start: 2023-12-26

## 2024-01-10 RX ORDER — AZITHROMYCIN 250 MG/1
TABLET, FILM COATED ORAL
Qty: 6 TABLET | Refills: 0 | Status: SHIPPED | OUTPATIENT
Start: 2024-01-10

## 2024-01-10 RX ORDER — BROMPHENIRAMINE MALEATE, PSEUDOEPHEDRINE HYDROCHLORIDE, AND DEXTROMETHORPHAN HYDROBROMIDE 2; 30; 10 MG/5ML; MG/5ML; MG/5ML
10 SYRUP ORAL 4 TIMES DAILY PRN
Qty: 240 ML | Refills: 0 | Status: SHIPPED | OUTPATIENT
Start: 2024-01-10

## 2024-01-10 NOTE — PATIENT INSTRUCTIONS
Change toothbrush after 1 to 2 days on antibiotics.  Alternate tylenol and motrin for pain and/or fever, stay hydrated and rest.   Salt water gargles as needed.    If symptoms worsen or do not improve follow up with your PCP or visit your nearest Urgent Care Center or ER.

## 2024-01-10 NOTE — PROGRESS NOTES
"Subjective   Chief Complaint   Patient presents with    Sore Throat              Nazia Romero is a 38 y.o. female.     History of Present Illness  Patient reports body aches, headache, chills, swollen tonsils with pus and congestion.  She was seen for the symptoms yesterday by her PCP.  She tested negative for strep flu and COVID.  Her PCP prescribed her amoxicillin to take for symptoms, she has a penicillin allergy but her provider advised her to take it to see if the allergy has resolved.  She does not feel comfortable taking amoxicillin is requesting alternate treatment.  Sore Throat   This is a new problem. The current episode started yesterday. The problem has been unchanged. Maximum temperature: tactile. Associated symptoms include congestion, headaches and swollen glands. Pertinent negatives include no abdominal pain, coughing, diarrhea, drooling, ear pain, hoarse voice, neck pain, shortness of breath, trouble swallowing or vomiting.        Allergies   Allergen Reactions    Penicillins Hives     However she \"has taken amoxicillin as an adult without issue\"       Past Medical History:   Diagnosis Date    Anxiety     Depression        Past Surgical History:   Procedure Laterality Date    HERNIA REPAIR         Social History     Socioeconomic History    Marital status: Single   Tobacco Use    Smoking status: Every Day     Packs/day: 1.00     Years: 17.00     Additional pack years: 0.00     Total pack years: 17.00     Types: Cigarettes    Smokeless tobacco: Never   Vaping Use    Vaping Use: Never used   Substance and Sexual Activity    Alcohol use: Never    Drug use: Never       History reviewed. No pertinent family history.      Current Outpatient Medications:     buPROPion XL (WELLBUTRIN XL) 300 MG 24 hr tablet, Take 1 tablet by mouth Daily., Disp: , Rfl:     azithromycin (Zithromax Z-Jones) 250 MG tablet, Take 2 tablets by mouth on day 1, then 1 tablet daily on days 2-5, Disp: 6 tablet, Rfl: 0    " brompheniramine-pseudoephedrine-DM 30-2-10 MG/5ML syrup, Take 10 mL by mouth 4 (Four) Times a Day As Needed for Congestion, Allergies or Cough., Disp: 240 mL, Rfl: 0    buprenorphine-naloxone (SUBOXONE) 8-2 MG per SL tablet, Place 2 tablets under the tongue Daily., Disp: , Rfl:     FLUoxetine (PROzac) 40 MG capsule, , Disp: , Rfl:     fluticasone (FLONASE) 50 MCG/ACT nasal spray, 2 sprays into the nostril(s) as directed by provider Daily for 30 days., Disp: 16 g, Rfl: 0    lamoTRIgine (LaMICtal) 100 MG tablet, Take 1 tablet by mouth every night at bedtime., Disp: , Rfl:       Review of Systems   Constitutional:  Negative for chills, diaphoresis, fatigue and fever.   HENT:  Positive for congestion, sore throat and swollen glands. Negative for drooling, ear pain, hoarse voice and trouble swallowing.    Respiratory:  Negative for cough, chest tightness, shortness of breath and wheezing.    Gastrointestinal:  Negative for abdominal pain, diarrhea and vomiting.   Musculoskeletal:  Negative for neck pain.   Neurological:  Negative for headache.        There were no vitals filed for this visit.    Objective   Physical Exam     Procedures     Assessment & Plan   Diagnoses and all orders for this visit:    1. Exudative tonsillitis (Primary)  -     azithromycin (Zithromax Z-Jones) 250 MG tablet; Take 2 tablets by mouth on day 1, then 1 tablet daily on days 2-5  Dispense: 6 tablet; Refill: 0  -     brompheniramine-pseudoephedrine-DM 30-2-10 MG/5ML syrup; Take 10 mL by mouth 4 (Four) Times a Day As Needed for Congestion, Allergies or Cough.  Dispense: 240 mL; Refill: 0      Change toothbrush after 1 to 2 days on antibiotics.  Alternate tylenol and motrin for pain and/or fever, stay hydrated and rest.   Salt water gargles as needed.    If symptoms worsen or do not improve follow up with your PCP or visit your nearest Urgent Care Center or ER.      PLAN: Discussed dosing, side effects, recommended other symptomatic care.  Patient  should follow up with primary care provider, Urgent Care or ER if symptoms worsen, fail to resolve or other symptoms need attention. Patient/family agree to the above.         TERA Mathew     The use of a video visit has been reviewed with the patient and verbal informed consent has been obtained. Myself and Nazia Romero participated in this visit. The patient is located at [address] [place] [address]. I am located in [place], [place]. [employer] and Zoom were utilized.        This visit was performed via Telehealth.  This patient has been instructed to follow-up with their primary care provider if their symptoms worsen or the treatment provided does not resolve their illness.

## 2024-01-22 DIAGNOSIS — F41.1 GAD (GENERALIZED ANXIETY DISORDER): ICD-10-CM

## 2024-01-22 DIAGNOSIS — F33.0 MILD EPISODE OF RECURRENT MAJOR DEPRESSIVE DISORDER: ICD-10-CM

## 2024-01-23 RX ORDER — BUSPIRONE HYDROCHLORIDE 5 MG/1
TABLET ORAL
Qty: 90 TABLET | Refills: 0 | Status: SHIPPED | OUTPATIENT
Start: 2024-01-23

## 2024-02-18 DIAGNOSIS — F41.1 GAD (GENERALIZED ANXIETY DISORDER): ICD-10-CM

## 2024-02-18 DIAGNOSIS — F33.0 MILD EPISODE OF RECURRENT MAJOR DEPRESSIVE DISORDER: ICD-10-CM

## 2024-02-19 RX ORDER — DULOXETIN HYDROCHLORIDE 20 MG/1
20 CAPSULE, DELAYED RELEASE ORAL DAILY
Qty: 30 CAPSULE | Refills: 1 | Status: SHIPPED | OUTPATIENT
Start: 2024-02-19

## 2024-02-27 PROCEDURE — 87635 SARS-COV-2 COVID-19 AMP PRB: CPT | Performed by: NURSE PRACTITIONER

## 2024-04-08 ENCOUNTER — TELEPHONE (OUTPATIENT)
Dept: OBSTETRICS AND GYNECOLOGY | Facility: CLINIC | Age: 39
End: 2024-04-08

## 2024-06-03 ENCOUNTER — TELEPHONE (OUTPATIENT)
Dept: OBSTETRICS AND GYNECOLOGY | Facility: CLINIC | Age: 39
End: 2024-06-03
Payer: COMMERCIAL

## 2024-06-26 ENCOUNTER — TRANSCRIBE ORDERS (OUTPATIENT)
Age: 39
End: 2024-06-26
Payer: COMMERCIAL

## 2024-06-26 DIAGNOSIS — I83.813 VARICOSE VEINS OF BOTH LOWER EXTREMITIES WITH PAIN: Primary | ICD-10-CM

## 2024-07-31 ENCOUNTER — APPOINTMENT (OUTPATIENT)
Dept: GENERAL RADIOLOGY | Facility: HOSPITAL | Age: 39
End: 2024-07-31
Payer: COMMERCIAL

## 2024-07-31 ENCOUNTER — HOSPITAL ENCOUNTER (EMERGENCY)
Facility: HOSPITAL | Age: 39
Discharge: HOME | End: 2024-07-31
Attending: EMERGENCY MEDICINE
Payer: COMMERCIAL

## 2024-07-31 VITALS
OXYGEN SATURATION: 96 % | SYSTOLIC BLOOD PRESSURE: 99 MMHG | WEIGHT: 182.98 LBS | HEIGHT: 63 IN | HEART RATE: 69 BPM | TEMPERATURE: 98.5 F | RESPIRATION RATE: 18 BRPM | DIASTOLIC BLOOD PRESSURE: 58 MMHG | BODY MASS INDEX: 32.42 KG/M2

## 2024-07-31 DIAGNOSIS — R07.9 CHEST PAIN, UNSPECIFIED TYPE: ICD-10-CM

## 2024-07-31 DIAGNOSIS — F41.1 ANXIETY REACTION: Primary | ICD-10-CM

## 2024-07-31 LAB
ALBUMIN SERPL-MCNC: 4.3 G/DL (ref 3.5–5.2)
ALBUMIN/GLOB SERPL: 1.5 G/DL
ALP SERPL-CCNC: 71 U/L (ref 39–117)
ALT SERPL W P-5'-P-CCNC: 8 U/L (ref 1–33)
ANION GAP SERPL CALCULATED.3IONS-SCNC: 10.7 MMOL/L (ref 5–15)
AST SERPL-CCNC: 13 U/L (ref 1–32)
BASOPHILS # BLD AUTO: 0.05 10*3/MM3 (ref 0–0.2)
BASOPHILS NFR BLD AUTO: 0.6 % (ref 0–1.5)
BILIRUB SERPL-MCNC: 0.2 MG/DL (ref 0–1.2)
BUN SERPL-MCNC: 11 MG/DL (ref 6–20)
BUN/CREAT SERPL: 15.7 (ref 7–25)
CALCIUM SPEC-SCNC: 8.9 MG/DL (ref 8.6–10.5)
CHLORIDE SERPL-SCNC: 105 MMOL/L (ref 98–107)
CO2 SERPL-SCNC: 24.3 MMOL/L (ref 22–29)
CREAT SERPL-MCNC: 0.7 MG/DL (ref 0.57–1)
DEPRECATED RDW RBC AUTO: 43.6 FL (ref 37–54)
EGFRCR SERPLBLD CKD-EPI 2021: 113.7 ML/MIN/1.73
EOSINOPHIL # BLD AUTO: 0.22 10*3/MM3 (ref 0–0.4)
EOSINOPHIL NFR BLD AUTO: 2.8 % (ref 0.3–6.2)
ERYTHROCYTE [DISTWIDTH] IN BLOOD BY AUTOMATED COUNT: 12 % (ref 12.3–15.4)
GEN 5 2HR TROPONIN T REFLEX: <6 NG/L
GLOBULIN UR ELPH-MCNC: 2.8 GM/DL
GLUCOSE SERPL-MCNC: 97 MG/DL (ref 65–99)
HCT VFR BLD AUTO: 37.8 % (ref 34–46.6)
HGB BLD-MCNC: 12.3 G/DL (ref 12–15.9)
HOLD SPECIMEN: NORMAL
HOLD SPECIMEN: NORMAL
IMM GRANULOCYTES # BLD AUTO: 0.02 10*3/MM3 (ref 0–0.05)
IMM GRANULOCYTES NFR BLD AUTO: 0.3 % (ref 0–0.5)
LIPASE SERPL-CCNC: 24 U/L (ref 13–60)
LYMPHOCYTES # BLD AUTO: 2.93 10*3/MM3 (ref 0.7–3.1)
LYMPHOCYTES NFR BLD AUTO: 37.9 % (ref 19.6–45.3)
MAGNESIUM SERPL-MCNC: 2.2 MG/DL (ref 1.6–2.6)
MCH RBC QN AUTO: 31.8 PG (ref 26.6–33)
MCHC RBC AUTO-ENTMCNC: 32.5 G/DL (ref 31.5–35.7)
MCV RBC AUTO: 97.7 FL (ref 79–97)
MONOCYTES # BLD AUTO: 0.44 10*3/MM3 (ref 0.1–0.9)
MONOCYTES NFR BLD AUTO: 5.7 % (ref 5–12)
NEUTROPHILS NFR BLD AUTO: 4.08 10*3/MM3 (ref 1.7–7)
NEUTROPHILS NFR BLD AUTO: 52.7 % (ref 42.7–76)
NRBC BLD AUTO-RTO: 0 /100 WBC (ref 0–0.2)
NT-PROBNP SERPL-MCNC: 37.3 PG/ML (ref 0–450)
PLATELET # BLD AUTO: 255 10*3/MM3 (ref 140–450)
PMV BLD AUTO: 9 FL (ref 6–12)
POTASSIUM SERPL-SCNC: 3.8 MMOL/L (ref 3.5–5.2)
PROT SERPL-MCNC: 7.1 G/DL (ref 6–8.5)
QT INTERVAL: 388 MS
QT INTERVAL: 411 MS
QTC INTERVAL: 436 MS
QTC INTERVAL: 446 MS
RBC # BLD AUTO: 3.87 10*6/MM3 (ref 3.77–5.28)
SODIUM SERPL-SCNC: 140 MMOL/L (ref 136–145)
TROPONIN T DELTA: NORMAL
TROPONIN T SERPL HS-MCNC: <6 NG/L
WBC NRBC COR # BLD AUTO: 7.74 10*3/MM3 (ref 3.4–10.8)
WHOLE BLOOD HOLD COAG: NORMAL
WHOLE BLOOD HOLD SPECIMEN: NORMAL

## 2024-07-31 PROCEDURE — 83735 ASSAY OF MAGNESIUM: CPT | Performed by: EMERGENCY MEDICINE

## 2024-07-31 PROCEDURE — 99284 EMERGENCY DEPT VISIT MOD MDM: CPT

## 2024-07-31 PROCEDURE — 80053 COMPREHEN METABOLIC PANEL: CPT | Performed by: EMERGENCY MEDICINE

## 2024-07-31 PROCEDURE — 96374 THER/PROPH/DIAG INJ IV PUSH: CPT

## 2024-07-31 PROCEDURE — 83880 ASSAY OF NATRIURETIC PEPTIDE: CPT | Performed by: EMERGENCY MEDICINE

## 2024-07-31 PROCEDURE — 71045 X-RAY EXAM CHEST 1 VIEW: CPT

## 2024-07-31 PROCEDURE — 83690 ASSAY OF LIPASE: CPT | Performed by: EMERGENCY MEDICINE

## 2024-07-31 PROCEDURE — 84484 ASSAY OF TROPONIN QUANT: CPT | Performed by: EMERGENCY MEDICINE

## 2024-07-31 PROCEDURE — 25010000002 LORAZEPAM PER 2 MG: Performed by: EMERGENCY MEDICINE

## 2024-07-31 PROCEDURE — 93005 ELECTROCARDIOGRAM TRACING: CPT

## 2024-07-31 PROCEDURE — 85025 COMPLETE CBC W/AUTO DIFF WBC: CPT | Performed by: EMERGENCY MEDICINE

## 2024-07-31 PROCEDURE — 36415 COLL VENOUS BLD VENIPUNCTURE: CPT

## 2024-07-31 PROCEDURE — 93005 ELECTROCARDIOGRAM TRACING: CPT | Performed by: EMERGENCY MEDICINE

## 2024-07-31 RX ORDER — LORAZEPAM 2 MG/ML
0.5 INJECTION INTRAMUSCULAR ONCE
Status: COMPLETED | OUTPATIENT
Start: 2024-07-31 | End: 2024-07-31

## 2024-07-31 RX ORDER — ASPIRIN 81 MG/1
324 TABLET, CHEWABLE ORAL ONCE
Status: DISCONTINUED | OUTPATIENT
Start: 2024-07-31 | End: 2024-07-31 | Stop reason: HOSPADM

## 2024-07-31 RX ORDER — SODIUM CHLORIDE 0.9 % (FLUSH) 0.9 %
10 SYRINGE (ML) INJECTION AS NEEDED
Status: DISCONTINUED | OUTPATIENT
Start: 2024-07-31 | End: 2024-07-31 | Stop reason: HOSPADM

## 2024-07-31 RX ADMIN — LORAZEPAM 0.5 MG: 2 INJECTION INTRAMUSCULAR; INTRAVENOUS at 01:25

## 2024-07-31 NOTE — ED PROVIDER NOTES
"Time: 12:48 AM EDT  Date of encounter:  7/31/2024  Independent Historian/Clinical History and Information was obtained by:   Patient    History is limited by: N/A    Chief Complaint: Chest pain      History of Present Illness:  Patient is a 38 y.o. year old female who presents to the emergency department for evaluation of chest pain    Patient describes fairly constant episodes of chest pain over the past 1 to 2 weeks she states they are associated with a sensation of panic and anxiety.  She states she feels a abnormal sensation started from her head and rushed down over her entire body.  She occasionally has some discomfort in her left arm the chest pain seems to alternate between her left and right side of her chest.  She states tonight it became too much and alarmed her.  States she has a history of anxiety she does not know of any specific trigger lately but she states that life is somewhat stressful.  States she is currently treated with Prozac but feels as though it is not working.  She is concerned that she has a family history of heart disease and she is a smoker so she is concerned about her own health.    HPI    Patient Care Team  Primary Care Provider: Luca Guerra MD    Past Medical History:     Allergies   Allergen Reactions    Penicillins Hives     However she \"has taken amoxicillin as an adult without issue\"     Past Medical History:   Diagnosis Date    Anxiety     Depression      Past Surgical History:   Procedure Laterality Date    HERNIA REPAIR       History reviewed. No pertinent family history.    Home Medications:  Prior to Admission medications    Medication Sig Start Date End Date Taking? Authorizing Provider   azithromycin (Zithromax Z-Jones) 250 MG tablet Take 2 tablets by mouth on day 1, then 1 tablet daily on days 2-5 5/14/24   Ira Good APRN   buprenorphine-naloxone (SUBOXONE) 8-2 MG per SL tablet Place 2 tablets under the tongue Daily. 4/28/23   Provider, MD Desi "   Diclofenac Sodium (VOLTAREN) 1 % gel gel Apply 4 g topically to the appropriate area as directed 4 (Four) Times a Day. 3/3/24   Desi Rain MD   DULoxetine (CYMBALTA) 20 MG capsule Take 1 capsule by mouth Daily. 3/17/24   Desi Rain MD   FLUoxetine (PROzac) 40 MG capsule  12/25/23   Desi Rain MD   fluticasone (FLONASE) 50 MCG/ACT nasal spray 2 sprays into the nostril(s) as directed by provider Daily for 30 days. 12/29/23 1/28/24  Payal Panda APRN   lamoTRIgine (LaMICtal) 100 MG tablet Take 1 tablet by mouth every night at bedtime. 11/30/23   Desi Rain MD   lisinopril-hydrochlorothiazide (PRINZIDE,ZESTORETIC) 10-12.5 MG per tablet Take 1 tablet by mouth Daily. 2/20/24   Desi Rain MD   methocarbamol (ROBAXIN) 500 MG tablet Take 1 tablet by mouth 4 (Four) Times a Day. 3/3/24   Desi Rain MD   varenicline (CHANTIX) 1 MG tablet  5/12/24   Desi Rain MD        Social History:   Social History     Tobacco Use    Smoking status: Every Day     Current packs/day: 1.00     Average packs/day: 1 pack/day for 17.0 years (17.0 ttl pk-yrs)     Types: Cigarettes    Smokeless tobacco: Never   Vaping Use    Vaping status: Never Used   Substance Use Topics    Alcohol use: Never    Drug use: Never         Review of Systems:  Review of Systems   Constitutional:  Negative for chills and fever.   HENT:  Negative for congestion, ear pain and sore throat.    Eyes:  Negative for pain.   Respiratory:  Negative for cough, chest tightness and shortness of breath.    Cardiovascular:  Positive for chest pain.   Gastrointestinal:  Negative for abdominal pain, diarrhea, nausea and vomiting.   Genitourinary:  Negative for flank pain and hematuria.   Musculoskeletal:  Negative for joint swelling.   Skin:  Negative for pallor.   Neurological:  Positive for dizziness. Negative for seizures and headaches.   Psychiatric/Behavioral:  The patient is nervous/anxious.    All  "other systems reviewed and are negative.       Physical Exam:  BP 99/58 (BP Location: Left arm)   Pulse 69   Temp 98.5 °F (36.9 °C) (Oral)   Resp 18   Ht 160 cm (63\")   Wt 83 kg (182 lb 15.7 oz)   LMP 07/05/2024   SpO2 96%   BMI 32.41 kg/m²     Physical Exam  Vitals and nursing note reviewed.   Constitutional:       General: She is not in acute distress.     Appearance: Normal appearance. She is not toxic-appearing.      Comments: Patient appears mildly anxious and tearful.   HENT:      Head: Normocephalic and atraumatic.      Jaw: There is normal jaw occlusion.   Eyes:      General: Lids are normal.      Extraocular Movements: Extraocular movements intact.      Conjunctiva/sclera: Conjunctivae normal.      Pupils: Pupils are equal, round, and reactive to light.   Cardiovascular:      Rate and Rhythm: Normal rate and regular rhythm.      Pulses: Normal pulses.      Heart sounds: Normal heart sounds.   Pulmonary:      Effort: Pulmonary effort is normal. No respiratory distress.      Breath sounds: Normal breath sounds. No wheezing or rhonchi.   Abdominal:      General: Abdomen is flat.      Palpations: Abdomen is soft.      Tenderness: There is no abdominal tenderness. There is no guarding or rebound.   Musculoskeletal:         General: Normal range of motion.      Cervical back: Normal range of motion and neck supple.      Right lower leg: No edema.      Left lower leg: No edema.   Skin:     General: Skin is warm and dry.   Neurological:      Mental Status: She is alert and oriented to person, place, and time. Mental status is at baseline.   Psychiatric:         Mood and Affect: Mood normal. Mood is anxious.            Procedures:  Procedures      Medical Decision Making:      Comorbidities that affect care:    Anxiety, depression, tobacco use    External Notes reviewed:    Previous Clinic Note: Outpatient urgent care visit for acute URI May 2024      The following orders were placed and all results were " independently analyzed by me:  Orders Placed This Encounter   Procedures    XR Chest 1 View    [place] Draw    High Sensitivity Troponin T    Comprehensive Metabolic Panel    Lipase    BNP    Magnesium    CBC Auto Differential    High Sensitivity Troponin T 2Hr    Ambulatory Referral to Cardiology    Undress & Gown    Continuous Pulse Oximetry    ECG 12 Lead ED Triage Standing Order; Chest Pain    CBC & Differential    Green Top (Gel)    Lavender Top    Gold Top - SST    Light Blue Top       Medications Given in the Emergency Department:  Medications   LORazepam (ATIVAN) injection 0.5 mg (0.5 mg Intravenous Given 7/31/24 0125)        ED Course:    ED Course as of 07/31/24 0715 Wed Jul 31, 2024 0050 My interpretation of EKG: Sinus rhythm 76, no acute ischemia [JS]      ED Course User Index  [JS] Jeremiah Dhaliwal MD       Labs:    Lab Results (last 24 hours)       Procedure Component Value Units Date/Time    High Sensitivity Troponin T [[id number]]  (Normal) Collected: 07/31/24 0040    Specimen: Blood Updated: 07/31/24 0119     HS Troponin T <6 ng/L     Narrative:      High Sensitive Troponin T Reference Range:  <14.0 ng/L- Negative Female for AMI  <22.0 ng/L- Negative Male for AMI  >=14 - Abnormal Female indicating possible myocardial injury.  >=22 - Abnormal Male indicating possible myocardial injury.   Clinicians would have to utilize clinical acumen, EKG, Troponin, and serial changes to determine if it is an Acute Myocardial Infarction or myocardial injury due to an underlying chronic condition.         CBC & Differential [[id number]]  (Abnormal) Collected: 07/31/24 0040    Specimen: Blood Updated: 07/31/24 0052    Narrative:      The following orders were created for panel order CBC & Differential.  Procedure                               Abnormality         Status                     ---------                               -----------         ------                     CBC Auto Differential[[id number]]         Abnormal            Final result                 Please view results for these tests on the individual orders.    Comprehensive Metabolic Panel [[id number]] Collected: 07/31/24 0040    Specimen: Blood Updated: 07/31/24 0119     Glucose 97 mg/dL      BUN 11 mg/dL      Creatinine 0.70 mg/dL      Sodium 140 mmol/L      Potassium 3.8 mmol/L      Chloride 105 mmol/L      CO2 24.3 mmol/L      Calcium 8.9 mg/dL      Total Protein 7.1 g/dL      Albumin 4.3 g/dL      ALT (SGPT) 8 U/L      AST (SGOT) 13 U/L      Alkaline Phosphatase 71 U/L      Total Bilirubin 0.2 mg/dL      Globulin 2.8 gm/dL      A/G Ratio 1.5 g/dL      BUN/Creatinine Ratio 15.7     Anion Gap 10.7 mmol/L      eGFR 113.7 mL/min/1.73     Narrative:      GFR Normal >60  Chronic Kidney Disease <60  Kidney Failure <15      Lipase [[id number]]  (Normal) Collected: 07/31/24 0040    Specimen: Blood Updated: 07/31/24 0119     Lipase 24 U/L     BNP [[id number]]  (Normal) Collected: 07/31/24 0040    Specimen: Blood Updated: 07/31/24 0115     proBNP 37.3 pg/mL     Narrative:      This assay is used as an aid in the diagnosis of individuals suspected of having heart failure. It can be used as an aid in the diagnosis of acute decompensated heart failure (ADHF) in patients presenting with signs and symptoms of ADHF to the emergency department (ED). In addition, NT-proBNP of <300 pg/mL indicates ADHF is not likely.    Age Range Result Interpretation  NT-proBNP Concentration (pg/mL:      <50             Positive            >450                   Gray                 300-450                    Negative             <300    50-75           Positive            >900                  Gray                300-900                  Negative            <300      >75             Positive            >1800                  Gray                300-1800                  Negative            <300    Magnesium [[id number]]  (Normal) Collected: 07/31/24 0040    Specimen: Blood Updated: 07/31/24  0119     Magnesium 2.2 mg/dL     CBC Auto Differential [[id number]]  (Abnormal) Collected: 07/31/24 0040    Specimen: Blood Updated: 07/31/24 0052     WBC 7.74 10*3/mm3      RBC 3.87 10*6/mm3      Hemoglobin 12.3 g/dL      Hematocrit 37.8 %      MCV 97.7 fL      MCH 31.8 pg      MCHC 32.5 g/dL      RDW 12.0 %      RDW-SD 43.6 fl      MPV 9.0 fL      Platelets 255 10*3/mm3      Neutrophil % 52.7 %      Lymphocyte % 37.9 %      Monocyte % 5.7 %      Eosinophil % 2.8 %      Basophil % 0.6 %      Immature Grans % 0.3 %      Neutrophils, Absolute 4.08 10*3/mm3      Lymphocytes, Absolute 2.93 10*3/mm3      Monocytes, Absolute 0.44 10*3/mm3      Eosinophils, Absolute 0.22 10*3/mm3      Basophils, Absolute 0.05 10*3/mm3      Immature Grans, Absolute 0.02 10*3/mm3      nRBC 0.0 /100 WBC     High Sensitivity Troponin T 2Hr [[id number]] Collected: 07/31/24 0331    Specimen: Blood from Arm, Right Updated: 07/31/24 0355     HS Troponin T <6 ng/L      Troponin T Delta --     Comment: Unable to calculate.       Narrative:      High Sensitive Troponin T Reference Range:  <14.0 ng/L- Negative Female for AMI  <22.0 ng/L- Negative Male for AMI  >=14 - Abnormal Female indicating possible myocardial injury.  >=22 - Abnormal Male indicating possible myocardial injury.   Clinicians would have to utilize clinical acumen, EKG, Troponin, and serial changes to determine if it is an Acute Myocardial Infarction or myocardial injury due to an underlying chronic condition.                  Imaging:    XR Chest 1 View    Result Date: 7/31/2024  XR CHEST 1 VW-  Date of exam: 7/31/2024, 1:00 A.M.  Indication: Chest pain--triage protocol.  Comparison: None available.  FINDINGS: A single frontal (AP or PA upright portable) chest radiograph reveals no cardiac enlargement and no acute infiltrate. No pneumothorax is seen. No pleural effusion. Chronic calcified granulomatous disease may involve the chest.       No acute cardiopulmonary disease is seen  radiographically.    Please note that portions of this note were completed with a voice recognition program.   Electronically Signed By-River Curry MD On:7/31/2024 1:07 AM         Differential Diagnosis and Discussion:    Chest Pain:  Based on the patient's signs and symptoms, I considered aortic dissection, myocardial infaction, pulmonary embolism, cardiac tamponade, pericarditis, pneumothorax, musculoskeletal chest pain and other differential diagnosis as an etiology of the patient's chest pain.     All labs were reviewed and interpreted by me.  All X-rays impressions were independently interpreted by me.  EKG was interpreted by me.    [facility]               Patient Care Considerations:    PERC: I used the PERC score to risk stratify the patient for PE and a CT of the chest was considered but ultimately not indicated in today's visit.      Consultants/Shared Management Plan:    None    Social Determinants of Health:    Patient is independent, reliable, and has access to care.       Disposition and Care Coordination:    Discharged: The patient is suitable and stable for discharge with no need for consideration of admission.    I have explained the patient´s condition, diagnoses and treatment plan based on the information available to me at this time. I have answered questions and addressed any concerns. The patient has a good  understanding of the patient´s diagnosis, condition, and treatment plan as can be expected at this point. The vital signs have been stable. The patient´s condition is stable and appropriate for discharge from the emergency department.      The patient will pursue further outpatient evaluation with the primary care physician or other designated or consulting physician as outlined in the discharge instructions. They are agreeable to this plan of care and follow-up instructions have been explained in detail. The patient has received these instructions in written format and has expressed an  understanding of the discharge instructions. The patient is aware that any significant change in condition or worsening of symptoms should prompt an immediate return to this or the closest emergency department or call to 911.  I have explained discharge medications and the need for follow up with the patient/caretakers. This was also printed in the discharge instructions. Patient was discharged with the following medications and follow up:      Medication List      No changes were made to your prescriptions during this visit.      Luca Guerra MD  [address] [address]  [phone]    Schedule an appointment as soon as possible for a visit          Final diagnoses:   Anxiety reaction   Chest pain, unspecified type        ED Disposition       ED Disposition   Discharge    Condition   Stable    Comment   --               This medical record created using voice recognition software.             Jeremiah Dhaliwal MD  07/31/24 2357

## 2024-08-07 ENCOUNTER — OFFICE VISIT (OUTPATIENT)
Age: 39
End: 2024-08-07
Payer: COMMERCIAL

## 2024-08-07 VITALS
WEIGHT: 187 LBS | DIASTOLIC BLOOD PRESSURE: 76 MMHG | SYSTOLIC BLOOD PRESSURE: 100 MMHG | HEIGHT: 63 IN | BODY MASS INDEX: 33.13 KG/M2

## 2024-08-07 DIAGNOSIS — R60.0 LOWER EXTREMITY EDEMA: Primary | ICD-10-CM

## 2024-08-07 DIAGNOSIS — I87.323 CHRONIC VENOUS HYPERTENSION WITH INFLAMMATION INVOLVING BOTH SIDES: ICD-10-CM

## 2024-08-07 DIAGNOSIS — I87.2 VENOUS (PERIPHERAL) INSUFFICIENCY: ICD-10-CM

## 2024-08-07 DIAGNOSIS — I87.8 VENOUS STASIS: ICD-10-CM

## 2024-08-07 PROBLEM — I87.329 CHRONIC VENOUS HYPERTENSION WITH INFLAMMATION: Status: ACTIVE | Noted: 2024-08-07

## 2024-08-07 RX ORDER — LISINOPRIL 10 MG/1
10 TABLET ORAL DAILY
COMMUNITY

## 2024-08-07 NOTE — PROGRESS NOTES
Patient Name: Nazia Romero    MRN: [id number] Encounter Date: 08/07/2024      Consulting Service: Vascular Surgery    Referring Provider: Luca Guerra MD       CHIEF COMPLAINT:  Chief Complaint   Patient presents with    Varicose Veins     NP referred for CEE VV       Subjective    HPI: Nazia Romero is a 38 y.o. female is being seen for evaluation/management of complaints of symptomatic varicose veins, venous insufficiency, and lower extremity edema of bilateral lower extremity.   Symptoms include Padilla symptoms: Edema/Swelling, Varicose veins, Spider veins, Tiredness/Fatigue, Burning, Itching, Cramping, Pain/Aches, Heaviness/Fullness, Stiffness, and Throbbing.   Patient has positive family history of the varicose veins and negative family history of DVT.  At this point time attempts at symptomatic control using elevation, compression and nonsteroidals have been been attempted.  Prior prior venous interventions  include  none .    PAST MEDICAL HISTORY:   Past Medical History:   Diagnosis Date    Anxiety     Depression       PAST SURGICAL HISTORY:   Past Surgical History:   Procedure Laterality Date    HERNIA REPAIR        FAMILY HISTORY: History reviewed. No pertinent family history.   SOCIAL HISTORY:   Social History     Tobacco Use    Smoking status: Every Day     Current packs/day: 1.00     Average packs/day: 1 pack/day for 17.0 years (17.0 ttl pk-yrs)     Types: Cigarettes    Smokeless tobacco: Never   Vaping Use    Vaping status: Never Used   Substance Use Topics    Alcohol use: Never    Drug use: Never      MEDICATIONS:   Current Outpatient Medications on File Prior to Visit   Medication Sig Dispense Refill    buprenorphine-naloxone (SUBOXONE) 8-2 MG per SL tablet Place 2 tablets under the tongue Daily.      Diclofenac Sodium (VOLTAREN) 1 % gel gel Apply 4 g topically to the appropriate area as directed 4 (Four) Times a Day.      FLUoxetine (PROzac) 40 MG capsule       lamoTRIgine (LaMICtal) 100 MG  "tablet Take 2 tablets by mouth every night at bedtime.      lisinopril (PRINIVIL,ZESTRIL) 10 MG tablet Take 1 tablet by mouth Daily.      azithromycin (Zithromax Z-Jones) 250 MG tablet Take 2 tablets by mouth on day 1, then 1 tablet daily on days 2-5 (Patient not taking: Reported on 8/7/2024) 6 tablet 0    DULoxetine (CYMBALTA) 20 MG capsule Take 1 capsule by mouth Daily. (Patient not taking: Reported on 8/7/2024)      fluticasone (FLONASE) 50 MCG/ACT nasal spray 2 sprays into the nostril(s) as directed by provider Daily for 30 days. 16 g 0    lisinopril-hydrochlorothiazide (PRINZIDE,ZESTORETIC) 10-12.5 MG per tablet Take 1 tablet by mouth Daily. (Patient not taking: Reported on 8/7/2024)      methocarbamol (ROBAXIN) 500 MG tablet Take 1 tablet by mouth 4 (Four) Times a Day. (Patient not taking: Reported on 8/7/2024)      varenicline (CHANTIX) 1 MG tablet  (Patient not taking: Reported on 8/7/2024)       No current facility-administered medications on file prior to visit.       ALLERGIES: Penicillins       Objective   Vitals:    08/07/24 1043   BP: 100/76   BP Location: Left arm   Patient Position: Sitting   Weight: 84.8 kg (187 lb)   Height: 160 cm (63\")     Body mass index is 33.13 kg/m².  BMI is >= 30 and <35. (Class 1 Obesity). The following options were offered after discussion;: weight loss educational material (shared in after visit summary), exercise counseling/recommendations, and Information on healthy weight added to patient's after visit summary.      PHYSICAL EXAM:   Physical Exam  Constitutional:       Appearance: Normal appearance.   HENT:      Head: Normocephalic and atraumatic.      Nose: Nose normal.   Eyes:      Extraocular Movements: Extraocular movements intact.      Pupils: Pupils are equal, round, and reactive to light.   Cardiovascular:      Rate and Rhythm: Normal rate.      Pulses: Normal pulses.      Heart sounds: Normal heart sounds.      Comments: Right great left medial inflammation and " discomfort  Pulmonary:      Effort: Pulmonary effort is normal.      Breath sounds: Normal breath sounds.   Abdominal:      General: Abdomen is flat. Bowel sounds are normal.      Palpations: Abdomen is soft.   Musculoskeletal:         General: Normal range of motion.      Cervical back: Normal range of motion and neck supple.      Right lower le+ Edema present.      Left lower le+ Edema present.   Skin:     General: Skin is warm and dry.   Neurological:      General: No focal deficit present.      Mental Status: She is alert and oriented to person, place, and time. Mental status is at baseline.   Psychiatric:         Mood and Affect: Mood normal.         Thought Content: Thought content normal.          Result Review   LABS:    CBC          2023    17:04 2024    00:40   CBC   WBC 6.30  7.74    RBC 3.75  3.87    Hemoglobin 12.0  12.3    Hematocrit 35.7  37.8    MCV 95.2  97.7    MCH 32.0  31.8    MCHC 33.6  32.5    RDW 12.0  12.0    Platelets 257  255      BMP          2023    17:04 2024    00:40   BMP   BUN 19  11    Creatinine 0.58  0.70    Sodium 143  140    Potassium 4.2  3.8    Chloride 107  105    CO2 24.1  24.3    Calcium 9.2  8.9      Lipid Panel          2023    17:04   Lipid Panel   Total Cholesterol 127    Triglycerides 102    HDL Cholesterol 42    VLDL Cholesterol 19    LDL Cholesterol  66    LDL/HDL Ratio 1.54               Results Review:       I reviewed the patient's new clinical results.    The following radiologic or non-invasive studies have been reviewed by me: CXR  No results found for this or any previous visit from the past 365 days.     XR Chest 1 View    Result Date: 2024  No acute cardiopulmonary disease is seen radiographically.    Please note that portions of this note were completed with a voice recognition program.   Electronically Signed By-River Curry MD On:2024 1:07 AM                   ASSESSMENT/PLAN:   Diagnoses and all orders for this  visit:    1. Lower extremity edema (Primary)    2. Chronic venous hypertension with inflammation involving both sides  -     Venous w Reflux Lower Extremity - Bilateral CAR; Future    3. Venous (peripheral) insufficiency  -     Venous w Reflux Lower Extremity - Bilateral CAR; Future    4. Venous stasis  -     Venous w Reflux Lower Extremity - Bilateral CAR; Future       38 y.o. female with somatic varicose veins of the right greater than left leg.  I think she has significant insufficiency and definitely has inflammation in the beds of the saphenous vein coming down the legs.  She has swelling that seems to and mostly at the ankle consistent with venous insufficiency.  She works at [employer] does wear knee-high compression stockings but is agreeable to medical grade thigh-high stockings.  She is recently had some chest pain and is going to have that worked up while we get her stabilized from her legs.  She definitely needs a bilateral class I mapping in the next 2 to 3 months and we will try to set this up for her.  She is given a note when she wears the stockings how much better her legs feel and that will be a good barometer as to what we can offer her when she comes back in.    I discussed the plan with the patient who is agreeable to the plan of care at this point. Thank you for this consult.   Follow Up  Return in about 3 months (around 11/7/2024).    Bipin Padilla MD   08/07/24

## 2025-01-23 ENCOUNTER — OFFICE VISIT (OUTPATIENT)
Age: 40
End: 2025-01-23
Payer: COMMERCIAL

## 2025-01-23 VITALS
DIASTOLIC BLOOD PRESSURE: 63 MMHG | WEIGHT: 182 LBS | HEIGHT: 63 IN | HEART RATE: 83 BPM | OXYGEN SATURATION: 96 % | SYSTOLIC BLOOD PRESSURE: 112 MMHG | BODY MASS INDEX: 32.25 KG/M2

## 2025-01-23 DIAGNOSIS — F31.60 BIPOLAR AFFECTIVE DISORDER, MIXED: ICD-10-CM

## 2025-01-23 DIAGNOSIS — F17.200 NICOTINE DEPENDENCE WITH CURRENT USE: ICD-10-CM

## 2025-01-23 DIAGNOSIS — F11.21 OPIOID DEPENDENCE IN REMISSION: Primary | ICD-10-CM

## 2025-01-23 RX ORDER — LAMOTRIGINE 200 MG/1
200 TABLET ORAL 2 TIMES DAILY
Qty: 30 TABLET | Refills: 2 | Status: SHIPPED | OUTPATIENT
Start: 2025-01-23 | End: 2026-01-23

## 2025-01-23 RX ORDER — BUPRENORPHINE HYDROCHLORIDE AND NALOXONE HYDROCHLORIDE DIHYDRATE 8; 2 MG/1; MG/1
2 TABLET SUBLINGUAL DAILY
Qty: 60 TABLET | Refills: 0 | Status: SHIPPED | OUTPATIENT
Start: 2025-01-23

## 2025-01-23 NOTE — PROGRESS NOTES
Office  Note     Patient Name: Nazia Romero  : 1985   MRN: [id number]     Referring Provider: Luca Guerra MD    Chief Complaint: Substance use    History of Present Illness:   HPI    Nazia Romero is a 39 y.o. female who is here today for follow up related to her opiate use disorder with which she has been afflicted over the last 12 years.  I have seen her for the last 2 years as her addictionologist.  She was previously in a Suboxone program in [facility] over 3 years before that and we have had her on Suboxone 8 mg 2 tablets sublingual daily since then.    We also diagnosed her and had harmed have been treating her for bipolar disorder she is currently also compliantly taking her Lamictal 200 mg twice daily though we are in the process of switching her antidepressant to get better mood stability with her dysthymia by getting her off her 80 mg of Prozac every day and switching over to mirtazapine 30 mg a day.  This is partly an effort to improve her sleep onset and duration as well.      Triggers: Her main triggers the fact that her 20-year-old son who is a man child is moved back into her apartment with the other 2 kids that live there anyway.  She he has a significant problem with depression and irritability and anger consistent with his history of ODD as an adolescent.  He is sad over his relationship with his girlfriend of 3 years just breaking up last month though he admits that needed to happen.    Cravings: Cravings are fleeting in response to her stress at work at [employer] though she just got a promotion yesterday that she is happy about most of her cravings or may have been triggered by her son older son the 20-year-old lately.  She does have a grandmother who is at the end of her life and in hospice so she is going to a grieving process as well.    Relapse Prevention: Relapse prevention hinges on starting her psychotherapy here ASAP.    UDS Confirmation: Pending    SADIQ (PDMP)  "Reviewed for Current/Active Medications      Past Surgical History:  Past Surgical History:   Procedure Laterality Date    HERNIA REPAIR         Problem List:  Patient Active Problem List   Diagnosis    Mild episode of recurrent major depressive disorder    NATALI (generalized anxiety disorder)    Lower extremity edema    Class 1 obesity with serious comorbidity and body mass index (BMI) of 31.0 to 31.9 in adult    Venous (peripheral) insufficiency    Venous stasis    Chronic venous hypertension with inflammation       Allergy:   Allergies   Allergen Reactions    Penicillins Hives     However she \"has taken amoxicillin as an adult without issue\"        Current Medications:   Current Outpatient Medications   Medication Sig Dispense Refill    buprenorphine-naloxone (SUBOXONE) 8-2 MG per SL tablet Place 2 tablets under the tongue Daily. 60 tablet 0    Diclofenac Sodium (VOLTAREN) 1 % gel gel Apply 4 g topically to the appropriate area as directed 4 (Four) Times a Day.      fexofenadine-pseudoephedrine (ALLEGRA-D 24) 180-240 MG per 24 hr tablet Take 1 tablet by mouth Daily. 20 tablet 0    fluticasone (FLONASE) 50 MCG/ACT nasal spray Administer 2 sprays into the nostril(s) as directed by provider Daily. 16 g 0    lisinopril (PRINIVIL,ZESTRIL) 10 MG tablet Take 1 tablet by mouth Daily.      lamoTRIgine (LaMICtal) 200 MG tablet Take 1 tablet by mouth 2 (Two) Times a Day. 30 tablet 2     No current facility-administered medications for this visit.       Past Medical History:  Past Medical History:   Diagnosis Date    Anxiety     Depression     Substance abuse        Social History:  Social History     Socioeconomic History    Marital status: Single    Number of children: 4    Highest education level: 12th grade   Tobacco Use    Smoking status: Every Day     Current packs/day: 0.25     Average packs/day: 0.3 packs/day for 24.1 years (6.0 ttl pk-yrs)     Types: Cigarettes     Start date: 12/11/2000     Passive exposure: Never    " Smokeless tobacco: Never   Vaping Use    Vaping status: Never Used   Substance and Sexual Activity    Alcohol use: Never    Drug use: Not Currently     Types: Codeine, Hydrocodone, Morphine, Oxycodone, Marijuana    Sexual activity: Defer       Family History:  Family History   Problem Relation Age of Onset    Seizures Mother     Drug abuse Mother     ADD / ADHD Mother     Bipolar disorder Mother     Drug abuse Brother     Alcohol abuse Brother     ADD / ADHD Brother          Subjective      Review of Systems:   Review of Systems    PHQ-9 Score:       NATALI-7 Score:   Feeling nervous, anxious or on edge: Nearly every day  Not being able to stop or control worrying: Several days  Worrying too much about different things: More than half the days  Trouble Relaxing: Nearly every day  Being so restless that it is hard to sit still: Nearly every day  Feeling afraid as if something awful might happen: More than half the days  Becoming easily annoyed or irritable: More than half the days  NATALI 7 Total Score: 16  If you checked any problems, how difficult have these problems made it for you to do your work, take care of things at home, or get along with other people: Extremely difficult    Patient History:   The following portions of the patient's history were reviewed and updated as appropriate: allergies, current medications, past family history, past medical history, past social history, past surgical history and problem list.     Past medical history includes diagnoses of lumbar spondylosis and cervical spine pain.  For her hypertension she takes the lisinopril 10 mg once a day.  Social:  Social History     Socioeconomic History    Marital status: Single    Number of children: 4    Highest education level: 12th grade   Tobacco Use    Smoking status: Every Day     Current packs/day: 0.25     Average packs/day: 0.3 packs/day for 24.1 years (6.0 ttl pk-yrs)     Types: Cigarettes     Start date: 12/11/2000     Passive exposure:  "Never    Smokeless tobacco: Never   Vaping Use    Vaping status: Never Used   Substance and Sexual Activity    Alcohol use: Never    Drug use: Not Currently     Types: Codeine, Hydrocodone, Morphine, Oxycodone, Marijuana    Sexual activity: Defer       Medications:     Current Outpatient Medications:     buprenorphine-naloxone (SUBOXONE) 8-2 MG per SL tablet, Place 2 tablets under the tongue Daily., Disp: 60 tablet, Rfl: 0    Diclofenac Sodium (VOLTAREN) 1 % gel gel, Apply 4 g topically to the appropriate area as directed 4 (Four) Times a Day., Disp: , Rfl:     fexofenadine-pseudoephedrine (ALLEGRA-D 24) 180-240 MG per 24 hr tablet, Take 1 tablet by mouth Daily., Disp: 20 tablet, Rfl: 0    fluticasone (FLONASE) 50 MCG/ACT nasal spray, Administer 2 sprays into the nostril(s) as directed by provider Daily., Disp: 16 g, Rfl: 0    lisinopril (PRINIVIL,ZESTRIL) 10 MG tablet, Take 1 tablet by mouth Daily., Disp: , Rfl:     lamoTRIgine (LaMICtal) 200 MG tablet, Take 1 tablet by mouth 2 (Two) Times a Day., Disp: 30 tablet, Rfl: 2    Objective     Physical Exam:  Physical Exam    Vital Signs:   Vitals:    01/23/25 0823   BP: 112/63   Pulse: 83   SpO2: 96%   Weight: 82.6 kg (182 lb)   Height: 160 cm (63\")       Pill count: Did not bring    Body mass index is 32.24 kg/m².     MENTAL STATUS EXAM   General Appearance:  Cleanly groomed and dressed  Eye Contact:  Good eye contact  Attitude:  Cooperative  Motor Activity:  Normal gait, posture  Muscle Strength:  Normal  Speech:  Normal rate, tone, volume  Language:  Spontaneous  Mood and affect:  Normal, pleasant  Thought Process:  Logical  Associations/ Thought Content:  No delusions  Hallucinations:  None  Suicidal Ideations:  Not present  Homicidal Ideation:  Not present          Assessment / Plan      Diagnoses and all orders for this visit:    1. Opioid dependence in remission (Primary)  -     [facility] - Urine, Clean Catch; Future  -     " buprenorphine-naloxone (SUBOXONE) 8-2 MG per SL tablet; Place 2 tablets under the tongue Daily.  Dispense: 60 tablet; Refill: 0    2. Nicotine dependence with current use    3. Bipolar affective disorder, mixed  -     lamoTRIgine (LaMICtal) 200 MG tablet; Take 1 tablet by mouth 2 (Two) Times a Day.  Dispense: 30 tablet; Refill: 2           PLAN:  Safety: No acute safety concerns  Risk Assessment: Risk of self-harm acutely is low. Risk of self-harm chronically is also low, but could be further elevated in the event of treatment noncompliance and/or AODA.    TREATMENT PLAN/GOALS: Continue supportive psychotherapy efforts and medications as indicated. Treatment and medication options discussed during today's visit. Patient acknowledged and verbally consented to continue with current treatment plan and was educated on the importance of compliance with treatment and follow-up appointments.    MEDICATION ISSUES:  SADIQ reviewed as expected.  Discussed medication options and treatment plan of prescribed medication as well as the risks, benefits, and side effects including potential falls, possible impaired driving and metabolic adversities among others. Patient is agreeable to call the office with any worsening of symptoms or onset of side effects. Patient is agreeable to call 911 or go to the nearest ER should he/she begin having SI/HI. No medication side effects or related complaints today.     Return in about 1 month (around 2/23/2025).             This document has been electronically signed by Luca Guerra MD  January 23, 2025 09:11 EST      Part of this note may be an electronic transcription/translation of spoken language to printed text using the Dragon Dictation System.

## 2025-01-29 DIAGNOSIS — F11.21 OPIOID DEPENDENCE IN REMISSION: ICD-10-CM

## 2025-01-30 ENCOUNTER — OFFICE VISIT (OUTPATIENT)
Age: 40
End: 2025-01-30
Payer: COMMERCIAL

## 2025-01-30 DIAGNOSIS — F43.23 ADJUSTMENT DISORDER WITH MIXED ANXIETY AND DEPRESSED MOOD: ICD-10-CM

## 2025-01-30 DIAGNOSIS — F31.60 BIPOLAR AFFECTIVE DISORDER, MIXED: ICD-10-CM

## 2025-01-30 DIAGNOSIS — F17.200 NICOTINE DEPENDENCE WITH CURRENT USE: ICD-10-CM

## 2025-01-30 DIAGNOSIS — F11.21 OPIOID DEPENDENCE IN REMISSION: Primary | ICD-10-CM
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
## 2025-02-21 DIAGNOSIS — F11.21 OPIOID DEPENDENCE IN REMISSION: ICD-10-CM

## 2025-02-21 RX ORDER — BUPRENORPHINE HYDROCHLORIDE AND NALOXONE HYDROCHLORIDE DIHYDRATE 8; 2 MG/1; MG/1
2 TABLET SUBLINGUAL DAILY
Qty: 6 TABLET | Refills: 0 | Status: SHIPPED | OUTPATIENT
Start: 2025-02-21 | End: 2025-02-22 | Stop reason: SDUPTHER

## 2025-02-21 NOTE — TELEPHONE ENCOUNTER
PT CALLED WILL BE OUT OF MEDICATION ON SATURDAY PT NEEDS A BRIDGE SCRIPT SENT IN FOR 2 DAYS WORTH OF MEDICATION

## 2025-02-22 DIAGNOSIS — F11.21 OPIOID DEPENDENCE IN REMISSION: Primary | ICD-10-CM

## 2025-02-22 RX ORDER — BUPRENORPHINE HYDROCHLORIDE AND NALOXONE HYDROCHLORIDE DIHYDRATE 8; 2 MG/1; MG/1
2 TABLET SUBLINGUAL DAILY
Qty: 6 TABLET | Refills: 0 | Status: SHIPPED | OUTPATIENT
Start: 2025-02-22 | End: 2025-02-25

## 2025-02-25 ENCOUNTER — OFFICE VISIT (OUTPATIENT)
Age: 40
End: 2025-02-25
Payer: COMMERCIAL

## 2025-02-25 VITALS
OXYGEN SATURATION: 96 % | BODY MASS INDEX: 32.43 KG/M2 | WEIGHT: 183 LBS | SYSTOLIC BLOOD PRESSURE: 120 MMHG | DIASTOLIC BLOOD PRESSURE: 71 MMHG | HEART RATE: 100 BPM | HEIGHT: 63 IN

## 2025-02-25 DIAGNOSIS — F11.21 OPIOID DEPENDENCE IN REMISSION: ICD-10-CM

## 2025-02-25 DIAGNOSIS — F31.81 BIPOLAR II DISORDER: ICD-10-CM

## 2025-02-25 DIAGNOSIS — K02.7 DENTAL CARIES OF ROOT SURFACE: Primary | ICD-10-CM

## 2025-02-25 RX ORDER — AMOXICILLIN 500 MG/1
500 CAPSULE ORAL 3 TIMES DAILY
Qty: 90 CAPSULE | Refills: 0 | Status: SHIPPED | OUTPATIENT
Start: 2025-02-25 | End: 2025-03-27

## 2025-02-25 RX ORDER — BUPRENORPHINE HYDROCHLORIDE AND NALOXONE HYDROCHLORIDE DIHYDRATE 8; 2 MG/1; MG/1
2 TABLET SUBLINGUAL DAILY
Qty: 60 TABLET | Refills: 0 | Status: SHIPPED | OUTPATIENT
Start: 2025-02-25 | End: 2025-03-27

## 2025-02-25 NOTE — PROGRESS NOTES
Office  Note     Patient Name: Nazia Romero  : 1985   MRN: [id number]     Referring Provider: Luca Guerra MD    Chief Complaint: Substance use    History of Present Illness:   HPI    Nazia Romero is a 39 y.o. female who is here today for follow up related to her opiate use disorder.  She is taking her 16 mg of Suboxone tablets as 2 tablets once a day compliantly.  She is having more craving than usual because she is under more stress than usual which is also called because her sleep pattern to deteriorate.    Besides her grandmother passing away last year her 18-year-old son get kicked out of school last semester because he never paid his tuition even though the $12,000 student loan he received his all evaporated somehow.  The that the 18-year-old son is still living in campus dorm with some friends though he is not supposed to so he will be coming home shortly and is applying to get into the [employer].  She    She is also in some pain due to forbade teeth to her carious and to her molars that needed to be surgically excised.  The local oral surgeon she had lined up closed his office and she got referred to another location in [place] but they have never called her back.  Other offices she is called to say that they have a 6-month waiting list or they do not take her insurance so she is desperate on that front as well.      Triggers: The main triggers are anxiety which is where she is now getting up to sleepwalk and waking up to find herself in different rooms of the house at night.  She is still taking her Lamictal and has a fair mood stability and a normal range at least.    Cravings: Cravings are daily    Relapse Prevention: She sees a therapist tomorrow here.  She is seeing me once a month but probably needs to see the therapist more often than that currently.    UDS Confirmation: Negative UDS    SADIQ (PDMP) Reviewed for Current/Active Medications      Past Surgical  "History:  Past Surgical History:   Procedure Laterality Date    HERNIA REPAIR         Problem List:  Patient Active Problem List   Diagnosis    Mild episode of recurrent major depressive disorder    NATALI (generalized anxiety disorder)    Lower extremity edema    Class 1 obesity with serious comorbidity and body mass index (BMI) of 31.0 to 31.9 in adult    Venous (peripheral) insufficiency    Venous stasis    Chronic venous hypertension with inflammation       Allergy:   Allergies   Allergen Reactions    Penicillins Hives     However she \"has taken amoxicillin as an adult without issue\"        Current Medications:   Current Outpatient Medications   Medication Sig Dispense Refill    amoxicillin (AMOXIL) 500 MG capsule Take 1 capsule by mouth 3 (Three) Times a Day for 30 days. 90 capsule 0    buprenorphine-naloxone (SUBOXONE) 8-2 MG per SL tablet Place 2 tablets under the tongue Daily for 30 days. 60 tablet 0    Diclofenac Sodium (VOLTAREN) 1 % gel gel Apply 4 g topically to the appropriate area as directed 4 (Four) Times a Day.      fexofenadine-pseudoephedrine (ALLEGRA-D 24) 180-240 MG per 24 hr tablet Take 1 tablet by mouth Daily. 20 tablet 0    fluticasone (FLONASE) 50 MCG/ACT nasal spray Administer 2 sprays into the nostril(s) as directed by provider Daily. 16 g 0    lamoTRIgine (LaMICtal) 200 MG tablet Take 1 tablet by mouth 2 (Two) Times a Day. 30 tablet 2    lisinopril (PRINIVIL,ZESTRIL) 10 MG tablet Take 1 tablet by mouth Daily.       No current facility-administered medications for this visit.       Past Medical History:  Past Medical History:   Diagnosis Date    Anxiety     Depression     Substance abuse        Social History:  Social History     Socioeconomic History    Marital status: Single    Number of children: 4    Highest education level: 12th grade   Tobacco Use    Smoking status: Every Day     Current packs/day: 0.25     Average packs/day: 0.3 packs/day for 24.2 years (6.1 ttl pk-yrs)     Types: " Cigarettes     Start date: 12/11/2000     Passive exposure: Never    Smokeless tobacco: Never   Vaping Use    Vaping status: Never Used   Substance and Sexual Activity    Alcohol use: Never    Drug use: Not Currently     Types: Codeine, Hydrocodone, Morphine, Oxycodone, Marijuana    Sexual activity: Defer       Social History     Social History Narrative    Social History:    Social Support: FRIENDS    Residence: living setting HOME with CHILDREN    Current Employment: [employer]    Education: 12TH GRADE    Learning Disabilities: NONE    Legal history: NONE    Hobbies/interests: READ [employer]    Restorationism: NONE    Exercise: SOME    Dietary issues: SOME    Sleep issues: SOME    Caffeine intake: SOME     history: NONE         Family History:  Family History   Problem Relation Age of Onset    Seizures Mother     Drug abuse Mother     ADD / ADHD Mother     Bipolar disorder Mother     Drug abuse Brother     Alcohol abuse Brother     ADD / ADHD Brother          Subjective      Review of Systems:   Review of Systems    PHQ-9 Score:       NATALI-7 Score:        Patient History:   The following portions of the patient's history were reviewed and updated as appropriate: allergies, current medications, past family history, past medical history, past social history, past surgical history and problem list.     Social:  Social History     Socioeconomic History    Marital status: Single    Number of children: 4    Highest education level: 12th grade   Tobacco Use    Smoking status: Every Day     Current packs/day: 0.25     Average packs/day: 0.3 packs/day for 24.2 years (6.1 ttl pk-yrs)     Types: Cigarettes     Start date: 12/11/2000     Passive exposure: Never    Smokeless tobacco: Never   Vaping Use    Vaping status: Never Used   Substance and Sexual Activity    Alcohol use: Never    Drug use: Not Currently     Types: Codeine, Hydrocodone, Morphine, Oxycodone, Marijuana    Sexual activity: Defer       Medications:  "    Current Outpatient Medications:     amoxicillin (AMOXIL) 500 MG capsule, Take 1 capsule by mouth 3 (Three) Times a Day for 30 days., Disp: 90 capsule, Rfl: 0    buprenorphine-naloxone (SUBOXONE) 8-2 MG per SL tablet, Place 2 tablets under the tongue Daily for 30 days., Disp: 60 tablet, Rfl: 0    Diclofenac Sodium (VOLTAREN) 1 % gel gel, Apply 4 g topically to the appropriate area as directed 4 (Four) Times a Day., Disp: , Rfl:     fexofenadine-pseudoephedrine (ALLEGRA-D 24) 180-240 MG per 24 hr tablet, Take 1 tablet by mouth Daily., Disp: 20 tablet, Rfl: 0    fluticasone (FLONASE) 50 MCG/ACT nasal spray, Administer 2 sprays into the nostril(s) as directed by provider Daily., Disp: 16 g, Rfl: 0    lamoTRIgine (LaMICtal) 200 MG tablet, Take 1 tablet by mouth 2 (Two) Times a Day., Disp: 30 tablet, Rfl: 2    lisinopril (PRINIVIL,ZESTRIL) 10 MG tablet, Take 1 tablet by mouth Daily., Disp: , Rfl:     Objective     Physical Exam:  Physical Exam    Vital Signs:   Vitals:    02/25/25 0815   BP: 120/71   Pulse: 100   SpO2: 96%   Weight: 83 kg (183 lb)   Height: 160 cm (63\")       Pill count: Did not bring    Body mass index is 32.42 kg/m².     MENTAL STATUS EXAM   General Appearance:  Cleanly groomed and dressed  Eye Contact:  Good eye contact  Attitude:  Cooperative  Motor Activity:  Normal gait, posture  Muscle Strength:  Normal  Speech:  Normal rate, tone, volume  Language:  Spontaneous  Mood and affect:  Normal, pleasant  Hopelessness:  Denies  Loneliness: Denies  Thought Process:  Logical  Associations/ Thought Content:  No delusions  Hallucinations:  None  Suicidal Ideations:  Not present  Homicidal Ideation:  Not present          Assessment / Plan      Diagnoses and all orders for this visit:    1. Dental caries of root surface (Primary)  -     amoxicillin (AMOXIL) 500 MG capsule; Take 1 capsule by mouth 3 (Three) Times a Day for 30 days.  Dispense: 90 capsule; Refill: 0    2. Opioid dependence in remission  -     " buprenorphine-naloxone (SUBOXONE) 8-2 MG per SL tablet; Place 2 tablets under the tongue Daily for 30 days.  Dispense: 60 tablet; Refill: 0    3. Bipolar II disorder    She has no signs of bryan currently.  She is struggling with her anxiety but not any major depression so her bipolar disorder is in good control.      PLAN:  Safety: No acute safety concerns  Risk Assessment: Risk of self-harm acutely is low. Risk of self-harm chronically is also low, but could be further elevated in the event of treatment noncompliance and/or AODA.    TREATMENT PLAN/GOALS: Continue supportive psychotherapy efforts and medications as indicated. Treatment and medication options discussed during today's visit. Patient acknowledged and verbally consented to continue with current treatment plan and was educated on the importance of compliance with treatment and follow-up appointments.    MEDICATION ISSUES:  SADIQ reviewed as expected.  Discussed medication options and treatment plan of prescribed medication as well as the risks, benefits, and side effects including potential falls, possible impaired driving and metabolic adversities among others. Patient is agreeable to call the office with any worsening of symptoms or onset of side effects. Patient is agreeable to call 911 or go to the nearest ER should he/she begin having SI/HI. No medication side effects or related complaints today.     Return in about 1 month (around 3/25/2025).             This document has been electronically signed by Luca Guerra MD  February 25, 2025 08:41 EST      Part of this note may be an electronic transcription/translation of spoken language to printed text using the Dragon Dictation System.

## 2025-03-12 ENCOUNTER — OFFICE VISIT (OUTPATIENT)
Dept: PODIATRY | Facility: CLINIC | Age: 40
End: 2025-03-12
Payer: COMMERCIAL

## 2025-03-12 VITALS
WEIGHT: 180 LBS | TEMPERATURE: 96.9 F | HEART RATE: 76 BPM | OXYGEN SATURATION: 96 % | HEIGHT: 63 IN | DIASTOLIC BLOOD PRESSURE: 65 MMHG | BODY MASS INDEX: 31.89 KG/M2 | SYSTOLIC BLOOD PRESSURE: 91 MMHG

## 2025-03-12 DIAGNOSIS — B35.1 ONYCHOMYCOSIS: ICD-10-CM

## 2025-03-12 DIAGNOSIS — S92.502A CLOSED FRACTURE OF PHALANX OF LEFT FIFTH TOE, INITIAL ENCOUNTER: Primary | ICD-10-CM

## 2025-03-12 RX ORDER — CLOTRIMAZOLE AND BETAMETHASONE DIPROPIONATE 10; .64 MG/G; MG/G
1 CREAM TOPICAL 2 TIMES DAILY
Qty: 45 G | Refills: 2 | Status: SHIPPED | OUTPATIENT
Start: 2025-03-12

## 2025-03-13 NOTE — PROGRESS NOTES
[facility] - PODIATRY    Today's Date: 03/13/25    Patient Name: Nazia Romero  MRN: [id number]  CSN: [id number]  PCP: Luca Guerra MD,   Referring Provider: Referring, Self    SUBJECTIVE     Chief Complaint   Patient presents with    Left Foot - Pain, Establish Care, Fracture, Nail Problem     Fx 5th toe  struck toe on the corner of a wall seen at  3/10/25 exam xrays dx fx placed in a post op shoe and referred here   also herre for nail fungus    Right Foot - Establish Care, Nail Problem     fungus     HPI: Nazia Romero, a 39 y.o.female, presents to clinic.    History of Present Illness  The patient presents for evaluation of a broken toe and toenail fungus.    She reports an incident on Friday where she accidentally collided with the corner of a wall, resulting in a fracture of her toe. Despite a significant reduction in swelling, she continues to experience soreness and pain during ambulation. She has been managing the discomfort by limiting weight-bearing activities on the affected foot.    Additionally, she has been dealing with a persistent fungal infection in her toenails, which has progressively worsened over time and spread to both feet. The infection is not only causing cosmetic concerns but also inducing pain, particularly in the interdigital spaces. She has not sought any treatment for this condition. She expresses curiosity about the potential for her toenail to regrow normally, given its current state has been maintained for approximately a year. She reports no history of liver disease.    SOCIAL HISTORY  She works as a .         Past Medical History:   Diagnosis Date    Anxiety     Depression     Substance abuse      Past Surgical History:   Procedure Laterality Date    HERNIA REPAIR       Family History   Problem Relation Age of Onset    Seizures Mother     Drug abuse Mother     ADD / ADHD Mother     Bipolar disorder Mother     Drug abuse Brother     Alcohol abuse  "Brother     ADD / ADHD Brother      Social History     Socioeconomic History    Marital status: Single    Number of children: 4    Highest education level: 12th grade   Tobacco Use    Smoking status: Every Day     Current packs/day: 0.25     Average packs/day: 0.3 packs/day for 24.3 years (6.1 ttl pk-yrs)     Types: Cigarettes     Start date: 12/11/2000     Passive exposure: Never    Smokeless tobacco: Never   Vaping Use    Vaping status: Never Used   Substance and Sexual Activity    Alcohol use: Never    Drug use: Not Currently     Types: Codeine, Hydrocodone, Morphine, Oxycodone, Marijuana    Sexual activity: Defer     Allergies   Allergen Reactions    Penicillins Hives     However she \"has taken amoxicillin as an adult without issue\"     Current Outpatient Medications   Medication Sig Dispense Refill    buprenorphine-naloxone (SUBOXONE) 8-2 MG per SL tablet Place 2 tablets under the tongue Daily for 30 days. 60 tablet 0    Diclofenac Sodium (VOLTAREN) 1 % gel gel Apply 4 g topically to the appropriate area as directed 4 (Four) Times a Day.      fexofenadine-pseudoephedrine (ALLEGRA-D 24) 180-240 MG per 24 hr tablet Take 1 tablet by mouth Daily. 20 tablet 0    lamoTRIgine (LaMICtal) 200 MG tablet Take 1 tablet by mouth 2 (Two) Times a Day. 30 tablet 2    lisinopril (PRINIVIL,ZESTRIL) 10 MG tablet Take 1 tablet by mouth Daily.      clotrimazole-betamethasone (LOTRISONE) 1-0.05 % cream Apply 1 Application topically to the appropriate area as directed 2 (Two) Times a Day. Apply to affected area twice daily 45 g 2     No current facility-administered medications for this visit.     Review of Systems    OBJECTIVE     Vitals:    03/12/25 1403   BP: 91/65   Pulse: 76   Temp: 96.9 °F (36.1 °C)   SpO2: 96%       WBC   Date Value Ref Range Status   07/31/2024 7.74 3.40 - 10.80 10*3/mm3 Final   03/30/2023 6.09 4.5 - 11.0 10*3/uL Final     RBC   Date Value Ref Range Status   07/31/2024 3.87 3.77 - 5.28 10*6/mm3 Final "   03/30/2023 3.76 (L) 4.0 - 5.2 10*6/uL Final     Hemoglobin   Date Value Ref Range Status   07/31/2024 12.3 12.0 - 15.9 g/dL Final   03/30/2023 11.6 (L) 12.0 - 16.0 g/dL Final     Hematocrit   Date Value Ref Range Status   07/31/2024 37.8 34.0 - 46.6 % Final   03/30/2023 36.0 36.0 - 46.0 % Final     MCV   Date Value Ref Range Status   07/31/2024 97.7 (H) 79.0 - 97.0 fL Final   03/30/2023 95.7 80.0 - 100.0 fL Final     MCH   Date Value Ref Range Status   07/31/2024 31.8 26.6 - 33.0 pg Final   03/30/2023 30.9 26.0 - 34.0 pg Final     MCHC   Date Value Ref Range Status   07/31/2024 32.5 31.5 - 35.7 g/dL Final   03/30/2023 32.2 31.0 - 37.0 g/dL Final     RDW   Date Value Ref Range Status   07/31/2024 12.0 (L) 12.3 - 15.4 % Final   03/30/2023 12.7 12.0 - 16.8 % Final     RDW-SD   Date Value Ref Range Status   07/31/2024 43.6 37.0 - 54.0 fl Final     MPV   Date Value Ref Range Status   07/31/2024 9.0 6.0 - 12.0 fL Final   03/30/2023 10.3 8.4 - 12.4 fL Final     Platelets   Date Value Ref Range Status   07/31/2024 255 140 - 450 10*3/mm3 Final   03/30/2023 227 140 - 440 10*3/uL Final     Neutrophil Rel %   Date Value Ref Range Status   03/30/2023 54.4 45 - 80 % Final     Neutrophil %   Date Value Ref Range Status   07/31/2024 52.7 42.7 - 76.0 % Final     Lymphocyte Rel %   Date Value Ref Range Status   03/30/2023 35.1 15 - 50 % Final     Lymphocyte %   Date Value Ref Range Status   07/31/2024 37.9 19.6 - 45.3 % Final     Monocyte Rel %   Date Value Ref Range Status   03/30/2023 5.1 0 - 15 % Final     Monocyte %   Date Value Ref Range Status   07/31/2024 5.7 5.0 - 12.0 % Final     Eosinophil %   Date Value Ref Range Status   07/31/2024 2.8 0.3 - 6.2 % Final   03/30/2023 4.6 0 - 7 % Final     Basophil Rel %   Date Value Ref Range Status   03/30/2023 0.5 0 - 2 % Final     Basophil %   Date Value Ref Range Status   07/31/2024 0.6 0.0 - 1.5 % Final     Immature Grans %   Date Value Ref Range Status   07/31/2024 0.3 0.0 - 0.5 %  Final   03/30/2023 0.3 0.0 - 1.0 % Final     Neutrophils Absolute   Date Value Ref Range Status   03/30/2023 3.31 2.0 - 8.8 10*3/uL Final     Neutrophils, Absolute   Date Value Ref Range Status   07/31/2024 4.08 1.70 - 7.00 10*3/mm3 Final     Lymphocytes Absolute   Date Value Ref Range Status   03/30/2023 2.14 0.7 - 5.5 10*3/uL Final     Lymphocytes, Absolute   Date Value Ref Range Status   07/31/2024 2.93 0.70 - 3.10 10*3/mm3 Final     Monocytes Absolute   Date Value Ref Range Status   03/30/2023 0.31 0.0 - 1.7 10*3/uL Final     Monocytes, Absolute   Date Value Ref Range Status   07/31/2024 0.44 0.10 - 0.90 10*3/mm3 Final     Eosinophils Absolute   Date Value Ref Range Status   03/30/2023 0.28 0.0 - 0.8 10*3/uL Final     Eosinophils, Absolute   Date Value Ref Range Status   07/31/2024 0.22 0.00 - 0.40 10*3/mm3 Final     Basophils Absolute   Date Value Ref Range Status   03/30/2023 0.03 0.0 - 0.2 10*3/uL Final     Basophils, Absolute   Date Value Ref Range Status   07/31/2024 0.05 0.00 - 0.20 10*3/mm3 Final     Immature Grans, Absolute   Date Value Ref Range Status   07/31/2024 0.02 0.00 - 0.05 10*3/mm3 Final   03/30/2023 0.02 0.00 - 0.10 10*3/uL Final     nRBC   Date Value Ref Range Status   07/31/2024 0.0 0.0 - 0.2 /100 WBC Final         Lab Results   Component Value Date    GLUCOSE 97 07/31/2024    BUN 11 07/31/2024    CREATININE 0.70 07/31/2024    EGFRIFAFRI >60 11/10/2022    BCR 15.7 07/31/2024    K 3.8 07/31/2024    CO2 24.3 07/31/2024    CALCIUM 8.9 07/31/2024    ALBUMIN 4.3 07/31/2024    LABIL2 1.5 11/10/2022    AST 13 07/31/2024    ALT 8 07/31/2024       Patient seen in no apparent distress.      PHYSICAL EXAM:     Foot/Ankle Exam    GENERAL  Appearance:  appears stated age  Orientation:  AAOx3  Affect:  appropriate  Gait:  unimpaired  Assistance:  independent  Right shoe gear: casual shoe  Left shoe gear: casual shoe    VASCULAR     Right Foot Vascularity   Normal vascular exam    Dorsalis pedis:   2+  Posterior tibial:  2+  Skin temperature:  warm  Edema grading:  None  CFT:  < 3 seconds  Pedal hair growth:  Present  Varicosities:  none     Left Foot Vascularity   Normal vascular exam    Dorsalis pedis:  2+  Posterior tibial:  2+  Skin temperature:  warm  Edema grading:  None  CFT:  < 3 seconds  Pedal hair growth:  Present  Varicosities:  none     NEUROLOGIC     Right Foot Neurologic   Normal sensation    Light touch sensation: normal  Vibratory sensation: normal  Hot/Cold sensation: normal  Protective Sensation using Thornton-David Monofilament:   Sites intact: 10  Sites tested: 10     Left Foot Neurologic   Normal sensation    Light touch sensation: normal  Vibratory sensation: normal  Hot/Cold sensation:  normal  Protective Sensation using Thornton-David Monofilament:   Sites intact: 10  Sites tested: 10    MUSCULOSKELETAL     Left Foot Musculoskeletal   Tenderness:  toe 5 tenderness    MUSCLE STRENGTH     Right Foot Muscle Strength   Foot dorsiflexion:  4  Foot plantar flexion:  4  Foot inversion:  4  Foot eversion:  4     Left Foot Muscle Strength   Foot dorsiflexion:  4  Foot plantar flexion:  4  Foot inversion:  4  Foot eversion:  4    RANGE OF MOTION     Right Foot Range of Motion   Foot and ankle ROM within normal limits       Left Foot Range of Motion   Foot and ankle ROM within normal limits      DERMATOLOGIC      Right Foot Dermatologic   Skin  Right foot skin is intact.   Nails  1.  Positive for elongated, onychomycosis, abnormal thickness, subungual debris and ingrown toenail.  2.  Positive for elongated, onychomycosis, abnormal thickness, subungual debris and ingrown toenail.  3.  Positive for elongated, onychomycosis, abnormal thickness, subungual debris and ingrown toenail.  4.  Positive for elongated, onychomycosis, abnormal thickness, subungual debris and ingrown toenail.  5.  Positive for elongated, onychomycosis, abnormal thickness, subungual debris and ingrown toenail.  Nails  comment:  Toenails 1, 2, 3, 4, and 5     Left Foot Dermatologic   Skin  Left foot skin is intact.   Nails comment:  Toenails 1, 2, 3, 4, and 5  Nails  1.  Positive for elongated, onychomycosis, abnormal thickness, subungual debris and ingrown toenail.  2.  Positive for elongated, onychomycosis, abnormal thickness, subungual debris and ingrown toenail.  3.  Positive for elongated, onychomycosis, abnormal thickness, subungual debris and ingrown toenail.  4.  Positive for elongated, onychomycosis, abnormally thick, subungual debris and ingrown toenail.  5.  Positive for elongated, onychomycosis, abnormally thick, subungual debris and ingrown toenail.      RADIOLOGY:          XR Foot 3+ View Left  Result Date: 3/10/2025  Narrative: XR FOOT 3+ VW LEFT Date of Exam: 3/10/2025 5:35 PM EDT Indication: Pain, swelling, decreased weightbearing, decreased range of motion, hit left foot on a wall, complaining of pain and unable to move toes. Comparison: None available. Findings: There is an acute mildly impacted nondisplaced fracture of the proximal phalanx of the fifth digit. Fracture lines do not involve the articular surfaces. No other acute fracture or dislocation is seen. There is soft tissue swelling of the fifth digit.     Impression: Impression: 1. Acute impacted fracture of the proximal phalanx of the fifth digit. Electronically Signed: Dex Coates MD  3/10/2025 5:52 PM EDT  Workstation ID: [id number]      ASSESSMENT/PLAN     Diagnoses and all orders for this visit:    1. Closed fracture of phalanx of left fifth toe, initial encounter (Primary)    2. Onychomycosis    Other orders  -     clotrimazole-betamethasone (LOTRISONE) 1-0.05 % cream; Apply 1 Application topically to the appropriate area as directed 2 (Two) Times a Day. Apply to affected area twice daily  Dispense: 45 g; Refill: 2        Comprehensive lower extremity examination and evaluation was performed.    Assessment & Plan    The fracture is in a  satisfactory position and does not necessitate john splinting or surgical intervention. She was advised to wear open-toed footwear for the ensuing weeks and to avoid any impact on the affected toe. The healing process is expected to span approximately 1 to 2 months. A note for work clearance will be provided.      The fungal infection is not spreading from nail to nail but rather from the skin to the nails due to her occupation as a , which involves frequent toe jamming.  She was informed that the oral medication for onychomycosis could potentially induce hepatotoxicity. She was advised to soak her feet in a diluted solution of apple cider vinegar and warm water daily. Additionally, she was recommended to trim her nails and apply Vicks VapoRub. A prescription for a topical antifungal cream was provided. If the infection persists, she was instructed to contact the office to explore alternative treatment options.       Discussed findings and treatment plan including risks, benefits, and treatment options with patient in detail. Patient agreed with treatment plan.    Medications and allergies reviewed.  Reviewed available lab values along with other pertinent labs.  These were discussed with the patient.    An After Visit Summary was printed and given to the patient at discharge, including (if requested) any available informative/educational handouts regarding diagnosis, treatment, or medications. All questions were answered to patient/family satisfaction. Should symptoms fail to improve or worsen they agree to call or return to clinic or to go to the Emergency Department. Discussed the importance of following up with any needed screening tests/labs/specialist appointments and any requested follow-up recommended by me today. Importance of maintaining follow-up discussed and patient accepts that missed appointments can delay diagnosis and potentially lead to worsening of conditions.    Return if symptoms worsen  or fail to improve., or sooner if acute issues arise.    Patient or patient representative verbalized consent for the use of Ambient Listening during the visit with  Dex Justin DPM for chart documentation. 3/13/2025  07:44 EDT    This document has been electronically signed by Dex Jusitn DPM on March 13, 2025 07:44 EDT

## 2025-03-14 DIAGNOSIS — F11.21 OPIOID DEPENDENCE IN REMISSION: ICD-10-CM

## 2025-03-25 ENCOUNTER — OFFICE VISIT (OUTPATIENT)
Age: 40
End: 2025-03-25
Payer: COMMERCIAL

## 2025-03-25 VITALS
DIASTOLIC BLOOD PRESSURE: 74 MMHG | SYSTOLIC BLOOD PRESSURE: 137 MMHG | HEIGHT: 63 IN | OXYGEN SATURATION: 97 % | HEART RATE: 78 BPM | WEIGHT: 182 LBS | BODY MASS INDEX: 32.25 KG/M2

## 2025-03-25 DIAGNOSIS — F11.21 OPIOID DEPENDENCE IN REMISSION: Primary | ICD-10-CM

## 2025-03-25 DIAGNOSIS — F31.31 BIPOLAR AFFECTIVE DISORDER, CURRENTLY DEPRESSED, MILD: ICD-10-CM

## 2025-03-25 RX ORDER — LAMOTRIGINE 200 MG/1
200 TABLET ORAL 2 TIMES DAILY
Qty: 30 TABLET | Refills: 2 | Status: SHIPPED | OUTPATIENT
Start: 2025-03-25 | End: 2026-03-25

## 2025-03-25 RX ORDER — BUPRENORPHINE HYDROCHLORIDE AND NALOXONE HYDROCHLORIDE DIHYDRATE 8; 2 MG/1; MG/1
2 TABLET SUBLINGUAL DAILY
Qty: 14 TABLET | Refills: 0 | Status: SHIPPED | OUTPATIENT
Start: 2025-03-25 | End: 2025-03-28 | Stop reason: SDUPTHER

## 2025-03-25 NOTE — PROGRESS NOTES
Office  Note     Patient Name: Nazia Romero  : 1985   MRN: [id number]     Referring Provider: Luca Guerra MD    Chief Complaint: Substance use    History of Present Illness:   HPI    Nazia Romero is a 39 y.o. female who is here today for follow up related to her opiate use disorder.  She is still taking her Suboxone compliantly as 2 of the 8 mg tablets every day.  However she did miss her last therapy appointment which she no-showed for.  She attributed this noncompliant acted to her lack of focus after her grandmother  a month ago which should have made the therapy appointment more relevant to keep.    She is better now with her mood as in she is processing her grieving effectively.  She also is less worried about her 18-year-old son mostly because she has decided to let him suffer the negative consequences of his own choices since he is still at [employer] but not enrolled in school.  He is living in the dorm he is his ID is still active so he still getting food.  Of which would ordinarily be running up a tab.  He went the spring break with his buddies to [place] but he has no gainful employment at this point the patient has decided to cut him off as far as any money this support his lifestyle which includes Internet gambling.    She is taking her lamotrigine for her bipolar disorder consistently with good mood stability the day no bryan and no depression.  We will refill both medications but the Suboxone will only fill for a week which we pointed out to her was due to the last therapy appointment.      Triggers: None    Cravings: None    Relapse Prevention: She will see her therapist next in 3 days on a virtual visit.  She is taking a new job in 2 weeks which she feels like is a can be a better permanent position.  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    S  "Confirmation: Negative DOREEN BABCOCK (PDMP) Reviewed for Current/Active Medications      Past Surgical History:  Past Surgical History:   Procedure Laterality Date    HERNIA REPAIR         Problem List:  Patient Active Problem List   Diagnosis    Mild episode of recurrent major depressive disorder    NATALI (generalized anxiety disorder)    Lower extremity edema    Class 1 obesity with serious comorbidity and body mass index (BMI) of 31.0 to 31.9 in adult    Venous (peripheral) insufficiency    Venous stasis    Chronic venous hypertension with inflammation       Allergy:   Allergies   Allergen Reactions    Penicillins Hives     However she \"has taken amoxicillin as an adult without issue\"        Current Medications:   Current Outpatient Medications   Medication Sig Dispense Refill    buprenorphine-naloxone (SUBOXONE) 8-2 MG per SL tablet Place 2 tablets under the tongue Daily for 30 days. 14 tablet 0    lamoTRIgine (LaMICtal) 200 MG tablet Take 1 tablet by mouth 2 (Two) Times a Day. 30 tablet 2    clotrimazole-betamethasone (LOTRISONE) 1-0.05 % cream Apply 1 Application topically to the appropriate area as directed 2 (Two) Times a Day. Apply to affected area twice daily 45 g 2    Diclofenac Sodium (VOLTAREN) 1 % gel gel Apply 4 g topically to the appropriate area as directed 4 (Four) Times a Day.      fexofenadine-pseudoephedrine (ALLEGRA-D 24) 180-240 MG per 24 hr tablet Take 1 tablet by mouth Daily. 20 tablet 0    lisinopril (PRINIVIL,ZESTRIL) 10 MG tablet Take 1 tablet by mouth Daily.       No current facility-administered medications for this visit.       Past Medical History:  Past Medical History:   Diagnosis Date    Anxiety     Depression     Substance abuse        Social History:  Social History     Socioeconomic History    Marital status: Single    Number of children: 4    Highest education level: 12th grade   Tobacco Use    Smoking status: Every Day     Current packs/day: 0.25     Average packs/day: 0.3 " packs/day for 24.3 years (6.1 ttl pk-yrs)     Types: Cigarettes     Start date: 12/11/2000     Passive exposure: Never    Smokeless tobacco: Never   Vaping Use    Vaping status: Never Used   Substance and Sexual Activity    Alcohol use: Never    Drug use: Not Currently     Types: Codeine, Hydrocodone, Morphine, Oxycodone, Marijuana    Sexual activity: Defer       Social History     Social History Narrative    Social History:    Social Support: FRIENDS    Residence: living setting HOME with CHILDREN    Current Employment: [employer]    Education: 12TH GRADE    Learning Disabilities: NONE    Legal history: NONE    Hobbies/interests: READ [employer]    Cheondoism: NONE    Exercise: SOME    Dietary issues: SOME    Sleep issues: SOME    Caffeine intake: SOME     history: NONE         Family History:  Family History   Problem Relation Age of Onset    Seizures Mother     Drug abuse Mother     ADD / ADHD Mother     Bipolar disorder Mother     Drug abuse Brother     Alcohol abuse Brother     ADD / ADHD Brother          Subjective      Review of Systems:   Review of Systems    PHQ-9 Score:       NATALI-7 Score:        Patient History:   The following portions of the patient's history were reviewed and updated as appropriate: allergies, current medications, past family history, past medical history, past social history, past surgical history and problem list.     Social:  Social History     Socioeconomic History    Marital status: Single    Number of children: 4    Highest education level: 12th grade   Tobacco Use    Smoking status: Every Day     Current packs/day: 0.25     Average packs/day: 0.3 packs/day for 24.3 years (6.1 ttl pk-yrs)     Types: Cigarettes     Start date: 12/11/2000     Passive exposure: Never    Smokeless tobacco: Never   Vaping Use    Vaping status: Never Used   Substance and Sexual Activity    Alcohol use: Never    Drug use: Not Currently     Types: Codeine, Hydrocodone, Morphine, Oxycodone, Marijuana     "Sexual activity: Defer       Medications:     Current Outpatient Medications:     buprenorphine-naloxone (SUBOXONE) 8-2 MG per SL tablet, Place 2 tablets under the tongue Daily for 30 days., Disp: 14 tablet, Rfl: 0    lamoTRIgine (LaMICtal) 200 MG tablet, Take 1 tablet by mouth 2 (Two) Times a Day., Disp: 30 tablet, Rfl: 2    clotrimazole-betamethasone (LOTRISONE) 1-0.05 % cream, Apply 1 Application topically to the appropriate area as directed 2 (Two) Times a Day. Apply to affected area twice daily, Disp: 45 g, Rfl: 2    Diclofenac Sodium (VOLTAREN) 1 % gel gel, Apply 4 g topically to the appropriate area as directed 4 (Four) Times a Day., Disp: , Rfl:     fexofenadine-pseudoephedrine (ALLEGRA-D 24) 180-240 MG per 24 hr tablet, Take 1 tablet by mouth Daily., Disp: 20 tablet, Rfl: 0    lisinopril (PRINIVIL,ZESTRIL) 10 MG tablet, Take 1 tablet by mouth Daily., Disp: , Rfl:     Objective     Physical Exam:  Physical Exam    Vital Signs:   Vitals:    03/25/25 0819   BP: 137/74   Pulse: 78   SpO2: 97%   Weight: 82.6 kg (182 lb)   Height: 160 cm (63\")            Body mass index is 32.24 kg/m².     MENTAL STATUS EXAM   General Appearance:  Cleanly groomed and dressed  Eye Contact:  Good eye contact  Attitude:  Cooperative  Motor Activity:  Normal gait, posture  Muscle Strength:  Normal  Speech:  Normal rate, tone, volume  Language:  Spontaneous  Mood and affect:  Normal, pleasant  Hopelessness:  Denies  Loneliness: Denies  Thought Process:  Logical  Associations/ Thought Content:  No delusions  Hallucinations:  None  Suicidal Ideations:  Not present  Homicidal Ideation:  Not present          Assessment / Plan      Diagnoses and all orders for this visit:    1. Opioid dependence in remission (Primary)  -     buprenorphine-naloxone (SUBOXONE) 8-2 MG per SL tablet; Place 2 tablets under the tongue Daily for 30 days.  Dispense: 14 tablet; Refill: 0    2. Bipolar affective disorder, currently depressed, mild  -     lamoTRIgine " (LaMICtal) 200 MG tablet; Take 1 tablet by mouth 2 (Two) Times a Day.  Dispense: 30 tablet; Refill: 2           PLAN:  Safety: No acute safety concerns  Risk Assessment: Risk of self-harm acutely is low. Risk of self-harm chronically is also low, but could be further elevated in the event of treatment noncompliance and/or AODA.    TREATMENT PLAN/GOALS: Continue supportive psychotherapy efforts and medications as indicated. Treatment and medication options discussed during today's visit. Patient acknowledged and verbally consented to continue with current treatment plan and was educated on the importance of compliance with treatment and follow-up appointments.    MEDICATION ISSUES:  SADIQ reviewed as expected.  Discussed medication options and treatment plan of prescribed medication as well as the risks, benefits, and side effects including potential falls, possible impaired driving and metabolic adversities among others. Patient is agreeable to call the office with any worsening of symptoms or onset of side effects. Patient is agreeable to call 911 or go to the nearest ER should he/she begin having SI/HI. No medication side effects or related complaints today.     Return in about 1 month (around 4/25/2025).             This document has been electronically signed by Luca Guerra MD  March 25, 2025 08:48 EDT      Part of this note may be an electronic transcription/translation of spoken language to printed text using the Dragon Dictation System.

## 2025-03-28 ENCOUNTER — PATIENT ROUNDING (BHMG ONLY) (OUTPATIENT)
Age: 40
End: 2025-03-28
Payer: COMMERCIAL

## 2025-03-28 ENCOUNTER — TELEMEDICINE (OUTPATIENT)
Age: 40
End: 2025-03-28
Payer: COMMERCIAL

## 2025-03-28 DIAGNOSIS — F11.21 OPIOID DEPENDENCE IN REMISSION: Primary | ICD-10-CM

## 2025-03-28 DIAGNOSIS — F31.31 BIPOLAR AFFECTIVE DISORDER, CURRENTLY DEPRESSED, MILD: ICD-10-CM

## 2025-03-28 DIAGNOSIS — F11.21 OPIOID DEPENDENCE IN REMISSION: ICD-10-CM

## 2025-03-28 DIAGNOSIS — F17.200 NICOTINE DEPENDENCE WITH CURRENT USE: ICD-10-CM

## 2025-03-28 RX ORDER — BUPRENORPHINE HYDROCHLORIDE AND NALOXONE HYDROCHLORIDE DIHYDRATE 8; 2 MG/1; MG/1
2 TABLET SUBLINGUAL DAILY
Qty: 46 TABLET | Refills: 0 | Status: SHIPPED | OUTPATIENT
Start: 2025-03-28 | End: 2025-04-27

## 2025-03-28 NOTE — PROGRESS NOTES
March 28, 2025    Hello, may I speak with Nazia Romero?    My name is MARCI        I am  with [facility]  [facility]  [address]  [place] [id number] [phone].    Before we get started may I verify your date of birth? 1985    I am calling to officially welcome you to our practice and ask about your recent visit. Is this a good time to talk? yes    Tell me about your visit with us. What things went well?  EVERYTHING WENT GOOD       We're always looking for ways to make our patients' experiences even better. Do you have recommendations on ways we may improve?  no    Overall were you satisfied with your first visit to our practice? yes       I appreciate you taking the time to speak with me today. Is there anything else I can do for you? no      Thank you, and have a great day.

## 2025-03-28 NOTE — PROGRESS NOTES
[facility]   [address]  [place] [address]  [phone]     Referring physician/provider: No ref. provider found   PCP: Luca Guerra MD    Telehealth Encounter  Date of service: 3/28/2025    Time in: 8:00 AM  Time Out: 8:53AM    Chief Complaint  Substance Abuse/Depression    Mode of Visit: Video   Location of patient: -HOME-   Location of provider: +HOME+   You have chosen to receive care through a telehealth visit.   The patient has signed the video visit consent form.   The visit included audio and video interaction. No technical issues occurred during this visit.     This provider is located at [facility] using a secure [employer] Video Visit through [employer]. Patient is being seen remotely via telehealth at home address in [place] and stated they are in a secure environment for this session. The patient's condition being diagnosed/treated is appropriate for telemedicine. The provider identified herself as well as her credentials. The patient, and/or patients guardian, consent to be seen remotely, and when consent is given they understand that the consent allows for patient identifiable information to be sent to a third party as needed. They may refuse to be seen remotely at any time. The electronic data is encrypted and password protected, and the patient and/or guardian has been advised of the potential risks to privacy not withstanding such measures.     You have chosen to receive care through a telehealth visit.  Do you consent to use a video/audio connection for your medical care today? [x]  Yes     []  No     Referring Provider: Luca Guerra MD    Progress Note       History of Present Illness:   Nazia Romero is a 39 y.o. female who is being seen today for follow up individual Psychotherapy session.     History of Present Illness  The patient presents via virtual visit for evaluation of migraine, grief, and  substance abuse.    She experienced a severe migraine this morning, which she attributes to her allergies. She has a history of migraines, which were previously more intense and required medication. However, the frequency and intensity of these migraines have since decreased. She recently attended her grandmother's , an event she managed better than anticipated due to the support of her father. She has been visiting her grandmother's house with her father over the past few weekends, engaging in cleaning and organizing an estate sale. This process has been emotionally challenging, particularly as she discovered items from her childhood and her parents' past. She has been experiencing anger and sadness, emotions she is attempting to manage for the sake of her children. She is considering journaling as a potential coping mechanism. She also reports a strained relationship with her aunt, which has been exacerbated by recent events. She is contemplating reaching out to her aunt to apologize and attempt to mend their relationship.    She reports no current cravings for substances. She recently broke two toes, initially mistaking the injury for a stubbed or jammed pinky toe. Despite working 8 to 9 hours on the injured foot, she was able to walk afterward. She took ibuprofen 800 mg for pain relief, which was ineffective. Approximately 2 weeks ago, she took a single 5 mg hydrocodone pill from her mother's prescription, which did not alleviate the pain. She did not inform Dr. Guerra about this incident. She has been dealing with guilt and fear about the potential impact on her recovery. She has been postponing dental work due to fear of being prescribed pain medication. She has never failed a drug test and is committed to her recovery, expressing a desire to avoid reliance on pain medication. She has an appointment scheduled with  on 2025.          ICD-10-CM ICD-9-CM   1. Opioid dependence in remission   F11.21 304.03   2. Bipolar affective disorder, currently depressed, mild  F31.31 296.51   3. Nicotine dependence with current use  F17.200 305.1          Clinical Maneuvering/Intervention:   Assisted patient in processing above session content; acknowledged and normalized patient’s thoughts, feelings, and concerns by utilizing a person-centered approach in efforts to build appropriate rapport and a positive therapeutic relationship with open and honest communication.  Processed and rationalized patients thoughts and feelings regarding her GHADA and Mental Health, in particular dealing with grief.  Discussed triggers associated with patient's GHADA/Mental Health.  Also discussed coping skills for patient to implement such as Deep breathing.mindfulness, journaling thoughts feelings and emotions and relapse prevention.    Allowed patient to freely discuss issues without interruption or judgment. Provided safe, confidential environment to facilitate the development of positive therapeutic relationship and encourage open, honest communication. Assisted patient in identifying risk factors which would indicate the need for higher level of care including thoughts to harm self or others and/or self-harming behavior and encouraged patient to contact this office, call 911, or present to the nearest emergency room should any of these events occur. Discussed crisis intervention services and means to access. Patient adamantly and convincingly denies current suicidal or homicidal ideation or perceptual disturbance.    PHQ-Score Total:  PHQ-9 Total Score:      NATALI-7 Score Total:  NATALI-7 Score:                  Mental Status Exam:   Hygiene:   good  Cooperation:  Cooperative  Eye Contact:  Good  Psychomotor Behavior:  Appropriate  Affect:  Full range  Mood: normal  Speech:  Normal  Thought Process:  Goal directed  Thought Content:  Normal  Suicidal:  None  Homicidal:  None  Hallucinations:  None  Delusion:  None  Memory:   Intact  Orientation:  Person, Place, Time, and Situation  Reliability:  good  Insight:  Fair  Judgement:  Fair  Impulse Control:  Fair  Physical/Medical Issues:  Yes recently broken toes.      Patient's Support Network Includes:  children and father    Functional Status: Moderate impairment     Progress toward goal: Not at goal    Prognosis: Good with Ongoing Treatment     Medications:     Current Outpatient Medications:     buprenorphine-naloxone (SUBOXONE) 8-2 MG per SL tablet, Place 2 tablets under the tongue Daily for 30 days., Disp: 46 tablet, Rfl: 0    clotrimazole-betamethasone (LOTRISONE) 1-0.05 % cream, Apply 1 Application topically to the appropriate area as directed 2 (Two) Times a Day. Apply to affected area twice daily, Disp: 45 g, Rfl: 2    Diclofenac Sodium (VOLTAREN) 1 % gel gel, Apply 4 g topically to the appropriate area as directed 4 (Four) Times a Day., Disp: , Rfl:     fexofenadine-pseudoephedrine (ALLEGRA-D 24) 180-240 MG per 24 hr tablet, Take 1 tablet by mouth Daily., Disp: 20 tablet, Rfl: 0    lamoTRIgine (LaMICtal) 200 MG tablet, Take 1 tablet by mouth 2 (Two) Times a Day., Disp: 30 tablet, Rfl: 2    lisinopril (PRINIVIL,ZESTRIL) 10 MG tablet, Take 1 tablet by mouth Daily., Disp: , Rfl:     Visit Diagnosis/Orders Placed This Visit:    ICD-10-CM ICD-9-CM   1. Opioid dependence in remission  F11.21 304.03   2. Bipolar affective disorder, currently depressed, mild  F31.31 296.51   3. Nicotine dependence with current use  F17.200 305.1          Assessment and Plan   Diagnoses and all orders for this visit:    1. Opioid dependence in remission (Primary)    2. Bipolar affective disorder, currently depressed, mild    3. Nicotine dependence with current use        Assessment & Plan    Grief.  She is currently navigating the grieving process following her grandmother's demise. She was advised to maintain a journal to document her thoughts and emotions, which could potentially aid in processing her  grief. She was also encouraged to communicate with her aunt, expressing her feelings and extending an apology, while being prepared for any response.    Substance abuse.  She is actively participating in recovery programs and has demonstrated significant progress. She was commended for her honesty in disclosing the use of a single hydrocodone pill approximately 2 weeks ago. She was reassured that she can reach out to us for assistance in managing her pain without resorting to illicit use of medications. The importance of relapse prevention strategies, counseling, 12-step support, and medication-assisted treatments was emphasized. She was encouraged to continue her follow-up appointments with Dr. Mclain and to contact us if any issues arise prior to her next appointment.    Follow-up  The patient is scheduled for a follow-up visit on 04/18/2025 at 8:00 AM.      Safety: No acute safety concerns  Risk Assessment: Risk of self-harm acutely is low. Risk of self-harm chronically is also low, but could be further elevated in the event of treatment noncompliance and/or AODA.    Crisis Plan:  Symptoms and/or behaviors to indicate a crisis: Abuse of substances    What calming techniques or other strategies will patient use to de-escalate and stay safe: slow down, breathe, visualize calming self, think it though, listen to music, change focus, take a walk    Who is one person patient can contact to assist with de-escalation? Parents    Treatment Plan/Goals: Patient will continue supportive psychotherapy efforts and medication regimen as prescribed. Therapist will provide Cognitive Behavioral Therapy to assist patient in improving functioning and gaining coping skills, maintaining stability, and avoiding decompensation and the need for higher level of care. Plan for treatment was discussed during today's visit. Patient acknowledged and verbally consented to continue with current treatment plan and was educated on the importance  of compliance with treatment and follow-up appointments.     Patient will contact this office, call 911 or present to the nearest emergency room should suicidal or homicidal ideations occur.     Follow Up:   No follow-ups on file.    Patient's questions were answered.  Thank you for allowing me to participate in the care of this patient.  Dictated Utilizing Dragon Dictation. Please note that portions of this note were completed with a voice recognition program. Part of this note may be an electronic transcription/translation of spoken language to printed text using the Dragon Dictation System.      Patient or patient representative verbalized consent for the use of Ambient Listening during the visit with  MACKENZIE Rosado for chart documentation. 3/28/2025  10:56 EDT    [facility]   MACKENZIE Rosado  03/28/25  10:51 EDT

## 2025-04-18 ENCOUNTER — PATIENT ROUNDING (BHMG ONLY) (OUTPATIENT)
Age: 40
End: 2025-04-18
Payer: COMMERCIAL

## 2025-04-18 ENCOUNTER — TELEMEDICINE (OUTPATIENT)
Age: 40
End: 2025-04-18
Payer: COMMERCIAL

## 2025-04-18 DIAGNOSIS — F11.21 OPIOID DEPENDENCE IN REMISSION: Primary | ICD-10-CM

## 2025-04-18 DIAGNOSIS — F17.200 NICOTINE DEPENDENCE WITH CURRENT USE: ICD-10-CM

## 2025-04-18 DIAGNOSIS — F31.31 BIPOLAR AFFECTIVE DISORDER, CURRENTLY DEPRESSED, MILD: ICD-10-CM

## 2025-04-18 DIAGNOSIS — F43.23 ADJUSTMENT DISORDER WITH MIXED ANXIETY AND DEPRESSED MOOD: ICD-10-CM

## 2025-04-18 PROCEDURE — 90837 PSYTX W PT 60 MINUTES: CPT | Performed by: COUNSELOR

## 2025-04-18 NOTE — PROGRESS NOTES
April 18, 2025    Hello, may I speak with Nazia Romero?    My name is MARCI      I am  with [facility]  [facility]  [address]  [place] [address] [phone].    Before we get started may I verify your date of birth? 1985    I am calling to officially welcome you to our practice and ask about your recent visit. Is this a good time to talk? No LVM    Tell me about your visit with us. What things went well?  NO LVM       We're always looking for ways to make our patients' experiences even better. Do you have recommendations on ways we may improve?  no LVM    Overall were you satisfied with your first visit to our practice? No LVM       I appreciate you taking the time to speak with me today. Is there anything else I can do for you? No LVM    Thank you, and have a great day.

## 2025-04-18 NOTE — PROGRESS NOTES
[facility]   [address]  [place] [address]  [phone]     Referring physician/provider: No ref. provider found   PCP: Luca Guerra MD    Telehealth Encounter  Date of service: 4/21/2025    Time in: 8:00 AM  Time Out:8:54 AM    Chief Complaint  Substance Abuse, Anxiety, Depression (Grief)    Mode of Visit: Video   Location of patient: -HOME-   Location of provider: +HOME+   You have chosen to receive care through a telehealth visit.   The patient has signed the video visit consent form.   The visit included audio and video interaction. No technical issues occurred during this visit.     This provider is located at [facility] using a secure [employer] Video Visit through [employer]. Patient is being seen remotely via telehealth at home address in [place] and stated they are in a secure environment for this session. The patient's condition being diagnosed/treated is appropriate for telemedicine. The provider identified herself as well as her credentials. The patient, and/or patients guardian, consent to be seen remotely, and when consent is given they understand that the consent allows for patient identifiable information to be sent to a third party as needed. They may refuse to be seen remotely at any time. The electronic data is encrypted and password protected, and the patient and/or guardian has been advised of the potential risks to privacy not withstanding such measures.     You have chosen to receive care through a telehealth visit.  Do you consent to use a video/audio connection for your medical care today? [x]  Yes     []  No     Referring Provider: Luca Guerra MD    Progress Note         History of Present Illness  The patient is a 39-year-old female being seen today for a follow-up psychotherapy session for her opioid dependence in remission, nicotine dependence with current use, and bipolar disorder.    She  reports overall good health but has been experiencing significant stress due to the recent death of her uncle from a suspected drug overdose. This event has particularly affected her maternal grandmother, who is struggling to cope. Worry and concern for her Grandmother exacerbates her anxiety.The process of clearing out her grandmother's house has been completed, and the house is now being sold. A positive relationship with her father is maintained, and plans are in place for him to visit her for [facility]. However, there is concern about the upcoming [place] season, as it will be the first without her grandmother, who was a central figure in their family celebrations. Additionally, there is worry about her father's impending move to [place], fearing it may limit their interactions. Financial difficulties are currently being faced, and she is seeking full-time employment. 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 Her mother has relapsed into substance use following the death of her brother, leading to a deterioration in her mother's health. As a result, she has decided to distance herself from her mother until help is sought. No other family support for childcare is available. Her eldest son, who lives independently with his girlfriend, has agreed to care for his younger siblings over the weekend to allow her to work. Plans are in place to spend time with her father on Sunday.    No recent relapses in her recovery from opioid dependence are reported. A positive test for hydrocodone during a routine drug test is attributed to taking the medication for severe pain following a dental emergency. No substances have been used since this incident. An upcoming appointment with Dr. Trent next Thursday is causing apprehension due to the need to discuss the recent positive drug test. The importance of open  communication with healthcare providers is acknowledged, and there is regret for not discussing pain management options with Dr. Trent prior to self medicating.    Increased anxiety is being experienced, believed to be related to recent stressors in her life. Anxiety is described as intermittent but severe when it occurs, often interfering with daily activities such as work and childcare. Physical symptoms during these episodes include chest pressure, difficulty breathing, facial flushing, and palpitations. There is also a fear of having a stroke or heart attack during these episodes. Deep breathing exercises are being used as a coping mechanism, which are found to be helpful. An increase in tobacco use is noted, smoking close to a pack a day, which is believed to be contributing to the anxiety.    Social History:  - Positive relationship with father  - Eldest son lives independently with his girlfriend  - Financial difficulties and seeking full-time employment  - Limited childcare support    Substance Use:  - Opioid dependence in remission  - Nicotine dependence with current use (close to a pack a day)  - No recent relapses in opioid use  - Positive test for hydrocodone attributed to dental pain management  -No new use, cravings, compliant with MAT.        ICD-10-CM ICD-9-CM   1. Opioid dependence in remission  F11.21 304.03   2. Nicotine dependence with current use  F17.200 305.1   3. Adjustment disorder with mixed anxiety and depressed mood  F43.23 309.28   4. Bipolar affective disorder, currently depressed, mild  F31.31 296.51          Clinical Maneuvering/Intervention:   Assisted patient in processing above session content; acknowledged and normalized patient’s thoughts, feelings, and concerns by utilizing a person-centered approach in efforts to build appropriate rapport and a positive therapeutic relationship with open and honest communication.  Processed and rationalized patients thoughts and feelings  regarding current stressors, recovery and mental health.  Discussed triggers associated with patient's substance use and anxiety.  Also discussed coping skills for patient to implement such as grounding techniques to manage anxiety, the use of deep breathing exercises to regulate breathing in times of anxiousness. Use of journaling to process thoughts feelings and emotions and the benefits of engaging in 12 step support to manage stress and support recovery.    Allowed patient to freely discuss issues without interruption or judgment. Provided safe, confidential environment to facilitate the development of positive therapeutic relationship and encourage open, honest communication. Assisted patient in identifying risk factors which would indicate the need for higher level of care including thoughts to harm self or others and/or self-harming behavior and encouraged patient to contact this office, call 911, or present to the nearest emergency room should any of these events occur. Discussed crisis intervention services and means to access. Patient adamantly and convincingly denies current suicidal or homicidal ideation or perceptual disturbance.    PHQ-Score Total:  PHQ-9 Total Score: 18    NATALI-7 Score Total:  NATALI-7 Score: Feeling nervous, anxious or on edge: Several days  Not being able to stop or control worrying: Nearly every day  Worrying too much about different things: Nearly every day  Trouble Relaxing: More than half the days  Being so restless that it is hard to sit still: Several days  Feeling afraid as if something awful might happen: More than half the days  Becoming easily annoyed or irritable: Several days  NATALI 7 Total Score: 13  If you checked any problems, how difficult have these problems made it for you to do your work, take care of things at home, or get along with other people: Very difficult                Mental Status Exam:   Hygiene:   good  Cooperation:  Cooperative  Eye Contact:   Good  Psychomotor Behavior:  Appropriate  Affect:  Appropriate  Mood: anxious  Speech:  Normal  Thought Process:  Goal directed  Thought Content:  Normal  Suicidal:  None  Homicidal:  None  Hallucinations:  None  Delusion:  None  Memory:  Intact  Orientation:  Person, Place, Time, and Situation  Reliability:  good  Insight:  Fair  Judgement:  Good  Impulse Control:  Good  Physical/Medical Issues:  No      Patient's Support Network Includes:  son, children, and father    Functional Status: Moderate impairment     Progress toward goal: Not at goal    Prognosis: Good with Ongoing Treatment     Medications:     Current Outpatient Medications:     buprenorphine-naloxone (SUBOXONE) 8-2 MG per SL tablet, Place 2 tablets under the tongue Daily for 30 days., Disp: 46 tablet, Rfl: 0    clotrimazole-betamethasone (LOTRISONE) 1-0.05 % cream, Apply 1 Application topically to the appropriate area as directed 2 (Two) Times a Day. Apply to affected area twice daily, Disp: 45 g, Rfl: 2    Diclofenac Sodium (VOLTAREN) 1 % gel gel, Apply 4 g topically to the appropriate area as directed 4 (Four) Times a Day., Disp: , Rfl:     fexofenadine-pseudoephedrine (ALLEGRA-D 24) 180-240 MG per 24 hr tablet, Take 1 tablet by mouth Daily., Disp: 20 tablet, Rfl: 0    lamoTRIgine (LaMICtal) 200 MG tablet, Take 1 tablet by mouth 2 (Two) Times a Day., Disp: 30 tablet, Rfl: 2    lisinopril (PRINIVIL,ZESTRIL) 10 MG tablet, Take 1 tablet by mouth Daily., Disp: , Rfl:     Visit Diagnosis/Orders Placed This Visit:    ICD-10-CM ICD-9-CM   1. Opioid dependence in remission  F11.21 304.03   2. Nicotine dependence with current use  F17.200 305.1   3. Adjustment disorder with mixed anxiety and depressed mood  F43.23 309.28   4. Bipolar affective disorder, currently depressed, mild  F31.31 296.51          Assessment and Plan   Diagnoses and all orders for this visit:    1. Opioid dependence in remission (Primary)    2. Nicotine dependence with current use    3.  Adjustment disorder with mixed anxiety and depressed mood    4. Bipolar affective disorder, currently depressed, mild        Assessment & Plan  Problems:  - Opioid Dependence in Remission.  - Nicotine Dependence with Current Use.  - Bipolar Disorder.    Content of Therapy:  During the session, the patient discussed recent family stressors, including the death of her uncle and concerns about her grandmother's well-being. She expressed anxiety about upcoming holidays and her father's move to [place]. The patient also shared her struggles with increased anxiety and depressive episodes, as well as her recent positive test for hydrocodone due to a broken tooth. She discussed the impact of her mother's substance use on her own recovery and the challenges of managing childcare and work responsibilities.    Clinical Impression:  The patient appears to be managing her opioid dependence well, with no recent relapses. However, she is experiencing heightened anxiety and depressive symptoms, likely due to recent family stressors and the upcoming holidays. Her nicotine dependence may be contributing to her anxiety. Despite these challenges, she remains committed to her recovery and is actively seeking support and coping strategies.    Therapeutic Intervention:  During the session, cognitive-behavioral strategies were employed to reframe negative thoughts and explore feelings related to family stressors and anxiety. Mindfulness exercises and grounding techniques were recommended to help manage anxiety symptoms. Psychoeducation was provided on the importance of open communication with healthcare providers regarding pain management and recovery.    Plan:  - Continue recovery program and maintain open communication with healthcare providers about pain management needs.  - Consider smoking cessation strategies and discuss these with healthcare provider.  - Practice grounding techniques and deep breathing exercises to manage  anxiety.  - 12 step support encouraged.    Follow-up:  The patient is scheduled for a follow-up visit on 05/02/2025 at 8:00 AM.    Notes & Risk Factors:  - Recent family stressors, including the death of her uncle and concerns about her grandmother's well-being.  - Increased anxiety and depressive episodes.  - Mother's substance use impacting patient's recovery.  - Challenges in managing childcare and work responsibilities.  - Protective factors include patient's commitment to recovery and support from healthcare providers.    Time: *      Safety: No acute safety concerns  Risk Assessment: Risk of self-harm acutely is low. Risk of self-harm chronically is also low, but could be further elevated in the event of treatment noncompliance and/or AODA.    Crisis Plan:  Symptoms and/or behaviors to indicate a crisis: Abuse of substances    What calming techniques or other strategies will patient use to de-escalate and stay safe: slow down, breathe, visualize calming self, think it though, listen to music, change focus, take a walk    Who is one person patient can contact to assist with de-escalation? Son    Treatment Plan/Goals: Patient will continue supportive psychotherapy efforts and medication regimen as prescribed. Therapist will provide Cognitive Behavioral Therapy to assist patient in improving functioning and gaining coping skills, maintaining stability, and avoiding decompensation and the need for higher level of care. Plan for treatment was discussed during today's visit. Patient acknowledged and verbally consented to continue with current treatment plan and was educated on the importance of compliance with treatment and follow-up appointments.     Patient will contact this office, call 911 or present to the nearest emergency room should suicidal or homicidal ideations occur.     Follow Up:   Return in about 2 weeks (around 5/2/2025).    Patient's questions were answered.  Thank you for allowing me to participate  in the care of this patient.  Dictated Utilizing Dragon Dictation. Please note that portions of this note were completed with a voice recognition program. Part of this note may be an electronic transcription/translation of spoken language to printed text using the Dragon Dictation System.      Patient or patient representative verbalized consent for the use of Ambient Listening during the visit with  MACKENZIE Rosado for chart documentation. 4/21/2025  08:53 EDT    [facility]   MACKENZIE Rosado  04/21/25  08:16 EDT

## 2025-04-21 NOTE — TREATMENT PLAN
Multi-Disciplinary Problems (from Behavioral Health Treatment Plan)      Active Problems       Problem: Anxiety  Start Date: 04/18/25      Problem Details: The patient self-scales this problem as a 10 with 10 being the worst.    The patient experiences persistent and excessive worry that interferes with daily activities, along with symptoms of panic. These symptoms include restlessness, fatigue, difficulty concentrating, irritability, muscle tension, sleep disturbances, and panic attacks characterized by sudden, intense fear, palpitations, sweating, trembling, shortness of breath, and feelings of impending doom          Goal Priority Start Date Expected End Date End Date    Patient will develop and implement behavioral and cognitive strategies to reduce anxiety and irrational fears. High 04/18/25 10/17/25 --    Goal Details: Progress toward goal:  Not appropriate to rate progress toward goal since this is the initial treatment plan.    Behavioral Activation:    Goal: Increase engagement in positive activities to improve mood.  Implementation: Schedule enjoyable activities daily. Track your mood before and after these activities to see improvements.  Relaxation Techniques:    Goal: Reduce physical symptoms of anxiety.  Implementation: Practice deep breathing, progressive muscle relaxation, or guided imagery. Incorporate these techniques into your daily routine, especially during high-stress moments.  Cognitive Strategies  Cognitive Restructuring:    Goal: Challenge and change negative thought patterns.  Implementation: Identify irrational thoughts, evaluate their validity, and replace them with more balanced thoughts. Keep a thought journal to track and analyze these patterns.  Mindfulness-Based Cognitive Therapy (MBCT):    Goal: Increase awareness and acceptance of thoughts and feelings.  Implementation: Practice mindfulness meditation regularly. Focus on being present and accepting thoughts without  judgment.  Problem-Solving Therapy:    Goal: Develop effective coping strategies for stressful situations.  Implementation: Identify specific problems causing anxiety, brainstorm potential solutions, and implement the best ones. Evaluate the outcomes and adjust as needed.        Goal Intervention Frequency Start Date End Date    Help patient explore past emotional issues in relation to present anxiety. Q Month 04/18/25 --    Intervention Details: Duration of treatment until remission of symptoms.  Narrative Therapy  Goal: Reconstruct personal narratives to understand and mitigate anxiety.  Specific: Engage in narrative therapy sessions bi-weekly.  Measurable: Write and reflect on personal narratives after each session.  Attainable: Set a goal to rewrite at least one significant past experience within the first month.  Realistic: Work with a therapist to guide the narrative reconstruction process.  Time-limited: Achieve a reduction in anxiety symptoms within 3 months.    Psychodynamic Therapy  Goal: Understand how past emotional conflicts influence present anxiety.  Specific: Engage in psychodynamic therapy sessions bi-weekly.  Measurable: Journal insights and emotional responses after each session.  Attainable: Aim to uncover and address at least two significant past conflicts within 4 months.  Realistic: Ensure therapy sessions are focused and guided by a qualified therapist.  Time-limited: Notice improvement in self-awareness and anxiety management within 6 months.        Goal Intervention Frequency Start Date End Date    Help patient develop an awareness of their cognitive and physical responses to anxiety. Q Month 04/18/25 --    Intervention Details: Duration of treatment until remission of symptoms    Anxiety Awareness Journal  Goal: Increase awareness of cognitive and physical responses to anxiety.    Specific: Maintain an anxiety awareness journal daily.    Task: Write down thoughts, feelings, and physical  sensations experienced during moments of anxiety.  Measurable: Track entries and identify patterns.    Task: Review journal entries weekly to identify common triggers and responses.  Attainable: Start with brief entries and gradually increase detail.    Task: Begin with 5-minute entries and aim to expand to 10 minutes within 2 weeks.  Realistic: Integrate journaling into daily routine.    Task: Set aside a specific time each day (e.g., before bed) for journaling.  Time-limited: Achieve consistent journaling practice within 1 month.    Task: Aim to complete daily entries for 30 consecutive days.  Implementation Steps  Daily Journaling:    Morning: Note any anxiety experienced upon waking.  Throughout the Day: Record moments of anxiety, including thoughts and physical sensations.  Evening: Reflect on the day's entries and summarize key observations.  Weekly Review:    Task: Set aside time each week to review journal entries.  Objective: Identify patterns in triggers and responses.  Therapist Collaboration:    Task: Share journal entries with a therapist during sessions.  Objective: Discuss insights and develop coping strategies based on identified patterns.  Monitoring Progress  Consistency: Ensure daily entries are made.  Insight: Track the development of awareness and understanding of anxiety responses.  Adjustment: Modify journaling approach based on therapist feedback and personal observations.                Problem: Grief and Loss  Start Date: 04/18/25      Problem Details: The patient self-scales this problem as a 7 with 10 being the worst.  The patient is experiencing intense feelings of sadness and loss following a significant bereavement of her Teri. Symptoms include persistent sorrow, difficulty accepting the loss, withdrawal from social activities, changes in sleep        Goal Priority Start Date Expected End Date End Date    Patient will process feelings and identify and implement specific ways to  facilitate the grieving process. High 04/18/25 10/17/25 --    Goal Details: Progress toward goal:  Not appropriate to rate progress toward goal since this is the initial treatment plan.        Goal Intervention Frequency Start Date End Date    Assist patient in identifying and processing feelings about losses. Q Month 04/18/25 --    Intervention Details: Duration of treatment until remission of symptoms.  Help the patient process feelings and identify and implement specific ways to facilitate the grieving process.    Specific: Maintain a daily grief journal to document feelings and coping strategies.    Task: Write down emotions, thoughts, and physical sensations related to grief each day.  Measurable: Track the use and effectiveness of coping strategies.    Task: Review journal entries weekly to identify which strategies are most helpful.  Attainable: Start with brief entries and gradually increase detail.    Task: Begin with 5-minute entries and aim to expand to 10 minutes within 2 weeks.  Realistic: Integrate journaling into daily routine.    Task: Set aside a specific time each day (e.g., before bed) for journaling.  Time-limited: Achieve consistent journaling practice within 1 month.    Task: Aim to complete daily entries for 30 consecutive days.  Implementation Steps  Daily Journaling:    Morning: Note any grief experienced upon waking.  Throughout the Day: Record moments of grief, including thoughts and physical sensations.  Evening: Reflect on the day's entries and summarize key observations.  Weekly Review:    Task: Set aside time each week to review journal entries.  Objective: Identify patterns in triggers and responses, and evaluate the effectiveness of coping strategies.  Therapist Collaboration:    Task: Share journal entries with a therapist during sessions.  Objective: Discuss insights and develop coping strategies based on identified patterns.  Monitoring Progress  Consistency: Ensure daily entries  are made.  Insight: Track the development of awareness and understanding of grief responses.  Adjustment: Modify journaling approach based on therapist feedback and personal observations.          Goal Intervention Frequency Start Date End Date    Identify ways to grieve effectively and utilize healthy support systems Q Month 04/18/25 --    Intervention Details: Duration of treatment until remission of symptoms.  Help the patient identify effective grieving methods and utilize healthy support systems.                  Problem: Substance Abuse Issues  Start Date: 04/18/25      Problem Details: The patient self-scales this problem as a 10 with 10 being the worst.  The patient has a substance use disorder, which has significantly affected her relationships, work, social interactions, and emotional well-being. She is currently undergoing medication-assisted treatment (MAT) to aid in her recovery efforts.          Risk of Relapse: The patient remains at risk for relapse due to potential exposure to triggers and stressors.     Medication Adherence: Ensuring consistent adherence to MAT to maintain remission and prevent relapse.         Goal Priority Start Date Expected End Date End Date    Patient will abstain from mood altering substances. Critical 04/18/25 10/17/25 --    Goal Details: Progress toward goal:  Not appropriate to rate progress toward goal since this is the initial treatment plan.  The patient will achieve and maintain abstinence from all mood-altering substances, including alcohol and drugs.          Goal Interventions:     Individual Therapy:     Activity: Engage in regular individual therapy sessions to address underlying issues contributing to substance use.     Frequency: Weekly sessions for 12 weeks.     Measurement: Patient's progress in identifying triggers and developing coping strategies.     Support Groups:     Activity: Attend support group meetings, such as Alcoholics Anonymous (AA) or Narcotics  Anonymous (NA).     Frequency: Weekly meetings.     Measurement: Patient's participation and engagement in group discussions.     Relapse Prevention Plan:     Activity: Develop a personalized relapse prevention plan, including identifying high-risk situations and strategies to manage cravings.     Frequency: One-time development with weekly reviews.     Measurement: Patient's ability to implement the plan and avoid relapse.     Healthy Lifestyle Changes:     Activity: Encourage healthy lifestyle changes, such as regular exercise, balanced diet, and adequate sleep.     Frequency: Daily practice.     Measurement: Patient's self-reported improvements in physical and mental health.     Medication Management:     Activity: If applicable, adhere to prescribed medication regimen to support abstinence.     Frequency: As prescribed by a healthcare provider.     Measurement: Patient's adherence to medication and reduction in substance use.     Expected Outcomes:     The patient will demonstrate consistent abstinence from mood-altering substances.     The patient will develop effective coping strategies to manage triggers and cravings.     The patient will report improved physical and mental health.     The patient will actively participate in support groups and therapy sessions.     Evaluation:     Tools: Self-report questionnaires, therapist observations, urine drug screens, and progress tracking.     Timeline: Evaluation at the end of the 12-week period and ongoing monthly         Goal Intervention Frequency Start Date End Date    Provide education to increase patients understanding of addiction and relapse processes. Q Month 04/18/25 --    Intervention Details: Duration of treatment until discharged  .Increase the patient's understanding of addiction and the relapse process to enhance their ability to manage triggers and maintain long-term recovery.          Intervention:     Educational Sessions:     Conduct weekly  educational sessions focused on the nature of addiction, the brain's response to substances, and the psychological and physical aspects of dependency.     Include information on the stages of relapse (emotional, mental, and physical) and strategies to recognize and manage these stages.     Interactive Workshops:     Facilitate interactive workshops that involve role-playing scenarios to help the patient identify triggers and practice coping mechanisms.     Use visual aids, such as diagrams and videos, to illustrate the relapse process and effective prevention techniques.     Resource Provision:     Provide the patient with educational materials, such as pamphlets, articles, and online resources, that they can review at their own pace.     Encourage the use of recovery apps that offer daily tips, reminders, and support for managing addiction and preventing relapse.     Counseling and Support Groups:     Integrate individual counseling sessions to discuss personal experiences with addiction and relapse, and develop personalized strategies for prevention.     Recommend participation in support groups where the patient can share experiences and learn from others who have successfully managed their addiction.     Follow-Up and Evaluation:     Schedule regular follow-up appointments to assess the patient's understanding and application of the educational content.     Adjust the educational plan based on the patient's progress and feedback to ensure it remains effective and relevant.         Goal Priority Start Date Expected End Date End Date    Patient will develop insight into how to improve their relationships. High 04/18/25 10/17/25 --    Goal Details: Progress toward goal:  Not appropriate to rate progress toward goal since this is the initial treatment plan.    The patient will gain a deeper understanding of their interpersonal dynamics and learn strategies to enhance their relationships.     Interventions:      Individual Therapy:     Activity: Engage in therapy sessions to explore past and present relationship patterns and identify areas for improvement.     Frequency: Weekly sessions for 12 weeks.     Measurement: Patient's ability to articulate insights gained about their relationship behaviors.     Communication Skills Training:     Activity: Participate in exercises to improve communication skills, including active listening, assertiveness, and empathy.     Frequency: Weekly practice during therapy sessions and daily application.     Measurement: Patient's increased use of effective communication techniques in interactions.     Conflict Resolution Strategies:     Activity: Learn and practice conflict resolution strategies to manage disagreements constructively.     Frequency: Weekly sessions with role-playing scenarios.     Measurement: Patient's ability to apply conflict resolution techniques in real-life situations.     Self-Reflection Exercises:     Activity: Engage in self-reflection exercises, such as journaling, to understand personal contributions to relationship dynamics.     Frequency: Daily journaling for 10 minutes.     Measurement: Patient's self-reported insights and changes in relationship behaviors.     Relationship Building Activities:     Activity: Participate in activities that promote relationship building, such as shared hobbies or quality time with loved ones.     Frequency: Weekly planned activities.     Measurement: Patient's increased engagement in relationship-building activities and improved relationship satisfaction.     Expected Outcomes:     The patient will demonstrate improved communication skills and conflict resolution abilities.     The patient will gain insight into their relationship patterns and behaviors.     The patient will report enhanced relationship satisfaction and stronger connections with others.     The patient will actively engage in activities that foster positive  "relationships.     Evaluation:     Tools: Self-report questionnaires, therapist observations, feedback from significant others, and progress tracking.     Timeline: Evaluation at the end of the 12-week period and ongoing monthly reviews.              Goal Intervention Frequency Start Date End Date    Educate about 12 step recovery programs. Q Month 04/18/25 --    Intervention Details: Duration of treatment until discharged.  Increase the patient's knowledge and understanding of 12-step recovery programs to support their engagement in structured recovery activities.     Intervention:     Introduction to 12-Step Programs:     Provide an overview of the history, principles, and structure of 12-step recovery programs such as Alcoholics Anonymous (AA) and Narcotics Anonymous (NA).     Explain the significance of each step and how they collectively contribute to the recovery process.     Educational Materials:     Distribute literature, including pamphlets and booklets, that detail the 12 steps and the philosophy behind them.     Recommend reading materials such as the \"Big Book\" of AA, which offers insights and personal stories from individuals in recovery.     Interactive Sessions:     Conduct interactive sessions where the patient can discuss each step, ask questions, and share their thoughts.     Use visual aids and multimedia presentations to enhance understanding and retention of the information.     Guest Speakers:     Invite individuals who have successfully navigated 12-step programs to share their experiences and answer questions.     Facilitate discussions that allow the patient to hear firsthand accounts of the challenges and successes associated with 12-step recovery.     Support Group Participation:     Encourage the patient to attend local 12-step meetings to observe and participate in the group dynamics.     Provide information on meeting schedules, locations, and online options to ensure accessibility. "     Personal Reflection and Application:     Integrate individual counseling sessions to help the patient reflect on how the 12 steps can be applied to their own recovery journey.     Develop personalized strategies for incorporating the principles of the 12 steps into daily life.     Follow-Up and Evaluation:     Schedule regular follow-up appointments to assess the patient's understanding and engagement with the 12-step program.     Adjust the educational approach based on the patient's progress and feedback to ensure it remains effective and supportive.         Goal Priority Start Date Expected End Date End Date    Patient will improve positive self regard. High 04/18/25 10/17/25 --    Goal Details: Progress toward goal:  Not appropriate to rate progress toward goal since this is the initial treatment plan.       GOAL DETAILS The patient will develop a more positive view of themselves by recognizing and appreciating their strengths and achievements.     Interventions:     Cognitive Behavioral Therapy (CBT):     Activity: Engage in CBT sessions to identify and challenge negative self-talk and cognitive distortions.     Frequency: Monthly sessions for the next year.     Measurement: Patient's ability to reframe negative thoughts into positive affirmations.     Self-Compassion Exercises:     Activity: Practice self-compassion exercises, such as self-kindness and mindfulness meditation.     Frequency: Daily practice for 10 minutes.     Measurement: Patient's self-reported increase in feelings of self-compassion and reduced self-criticism.     Strengths Identification:     Activity: Create a list of personal strengths and achievements.     Frequency: One-time activity with weekly reviews.     Measurement: Patient's ability to identify and articulate their strengths.     Positive Affirmations:     Activity: Develop and repeat positive affirmations daily.     Frequency: Daily practice, morning and evening.      Measurement: Patient's increased use of positive affirmations in daily life.     Goal Setting:     Activity: Set and work towards small, achievable personal goals.     Frequency: Monthly goal-setting sessions.     Measurement: Patient's progress in achieving set goals and increased self-efficacy.     Expected Outcomes:     The patient will demonstrate an improved ability to recognize and appreciate their strengths.     The patient will show a reduction in negative self-talk and cognitive distortions.     The patient will report increased feelings of self-compassion and self-worth.     The patient will successfully achieve personal goals, reinforcing positive self-regard.     Evaluation:     Tools: Self-report questionnaires, therapist observations, and progress tracking.     Timeline: Evaluation at the end of the 12-week period and ongoing monthly reviews.                                I have discussed and reviewed this treatment plan with the patient.

## 2025-04-24 ENCOUNTER — OFFICE VISIT (OUTPATIENT)
Age: 40
End: 2025-04-24
Payer: COMMERCIAL

## 2025-04-24 VITALS
HEART RATE: 82 BPM | HEIGHT: 63 IN | OXYGEN SATURATION: 94 % | DIASTOLIC BLOOD PRESSURE: 67 MMHG | SYSTOLIC BLOOD PRESSURE: 116 MMHG | BODY MASS INDEX: 31.89 KG/M2 | WEIGHT: 180 LBS

## 2025-04-24 DIAGNOSIS — F43.23 ADJUSTMENT DISORDER WITH MIXED ANXIETY AND DEPRESSED MOOD: ICD-10-CM

## 2025-04-24 DIAGNOSIS — F17.200 NICOTINE DEPENDENCE WITH CURRENT USE: ICD-10-CM

## 2025-04-24 DIAGNOSIS — F31.81 BIPOLAR II DISORDER: Primary | ICD-10-CM

## 2025-04-24 DIAGNOSIS — F11.21 OPIOID DEPENDENCE IN REMISSION: ICD-10-CM

## 2025-04-24 RX ORDER — FLUOXETINE HYDROCHLORIDE 60 MG/1
60 TABLET, FILM COATED ORAL; ORAL DAILY
Qty: 90 TABLET | Refills: 0 | Status: SHIPPED | OUTPATIENT
Start: 2025-04-24

## 2025-04-24 RX ORDER — FLUOXETINE 20 MG/1
20 TABLET, FILM COATED ORAL DAILY
Qty: 90 TABLET | Refills: 1 | Status: SHIPPED | OUTPATIENT
Start: 2025-04-24

## 2025-04-24 RX ORDER — BUPRENORPHINE HYDROCHLORIDE AND NALOXONE HYDROCHLORIDE DIHYDRATE 8; 2 MG/1; MG/1
2 TABLET SUBLINGUAL DAILY
Qty: 60 TABLET | Refills: 0 | Status: SHIPPED | OUTPATIENT
Start: 2025-04-24 | End: 2025-05-24

## 2025-04-24 NOTE — PROGRESS NOTES
Office  Note     Patient Name: Nazia Romero  : 1985   MRN: [id number]     Referring Provider: Luca Guerra MD    Chief Complaint: Substance use    History of Present Illness:   HPI    Nazia Romero is a 39 y.o. female who is here today for follow up related to her opiate use disorder.  She is taking her 8 mg Suboxone tablets at a dose of 60 mg/day once a day every day and compliantly so resulting in no relapses on opiates.  She does have some craving occasionally due to her anxiety which she would like some help with.  It turns out on further discussion that she actually never doubled her lamotrigine dose 11 months ago from 200 to 400 mg a day. she still taking the 200 mg tablet once a day.  Also she is correctly taking her fluoxetine at 60 mg once a day.  We tried to raise the fluoxetine last fall to 80 mg a day but it caused her to get agitated as if her bryan was coming back.  At the time I thought she was taking her lamotrigine twice a day when in fact she was still only taking it once a day.    She is amenable to doubling the lamotrigine to twice a day for 2 weeks and then increasing the fluoxetine again to 80 mg once a day to see if her anxiety attacks and panic attacks can be improved.  She usually has 1 debilitating panic attack a week and then 2 milder anxiety attacks which she can struggle through per week these anxiety attacks are her major relapse trigger.    She has got some other personal health issues that are milder that need to be addressed.  She is going to make an appointment with her PCP next door [facility] just to get a wellness check which will before the end of the year and clues includes starting to get her mammograms.      Triggers: Anxiety is her main relapse trigger    Cravings: Her anxiety does trigger occasional cravings particularly when it flares up into an intense panic attack for 20 or 30 minutes.    Relapse Prevention: We considered using  "hydroxyzine for the panic attacks but usually that medicine does not even kick in until the anxiety attack is over so she and I agreed that it would be more productive to try to prevent the anxiety attacks by increasing her fluoxetine and Lamictal.    UDS Confirmation: Negative    SADIQ (PDMP) Reviewed for Current/Active Medications      Past Surgical History:  Past Surgical History:   Procedure Laterality Date    HERNIA REPAIR         Problem List:  Patient Active Problem List   Diagnosis    Mild episode of recurrent major depressive disorder    NATALI (generalized anxiety disorder)    Lower extremity edema    Class 1 obesity with serious comorbidity and body mass index (BMI) of 31.0 to 31.9 in adult    Venous (peripheral) insufficiency    Venous stasis    Chronic venous hypertension with inflammation       Allergy:   Allergies   Allergen Reactions    Penicillins Hives     However she \"has taken amoxicillin as an adult without issue\"        Current Medications:   Current Outpatient Medications   Medication Sig Dispense Refill    buprenorphine-naloxone (SUBOXONE) 8-2 MG per SL tablet Place 2 tablets under the tongue Daily for 30 days. 60 tablet 0    clotrimazole-betamethasone (LOTRISONE) 1-0.05 % cream Apply 1 Application topically to the appropriate area as directed 2 (Two) Times a Day. Apply to affected area twice daily 45 g 2    fexofenadine-pseudoephedrine (ALLEGRA-D 24) 180-240 MG per 24 hr tablet Take 1 tablet by mouth Daily. 20 tablet 0    lamoTRIgine (LaMICtal) 200 MG tablet Take 1 tablet by mouth 2 (Two) Times a Day. 30 tablet 2    lisinopril (PRINIVIL,ZESTRIL) 10 MG tablet Take 1 tablet by mouth Daily.      FLUoxetine (PROzac) 20 MG tablet Take 1 tablet by mouth Daily. 90 tablet 1    FLUoxetine (PROzac) 60 MG tablet Take 1 tablet by mouth Daily. 90 tablet 0     No current facility-administered medications for this visit.       Past Medical History:  Past Medical History:   Diagnosis Date    Anxiety     " Depression     Substance abuse        Social History:  Social History     Socioeconomic History    Marital status: Single    Number of children: 4    Highest education level: 12th grade   Tobacco Use    Smoking status: Every Day     Current packs/day: 0.25     Average packs/day: 0.3 packs/day for 24.4 years (6.1 ttl pk-yrs)     Types: Cigarettes     Start date: 12/11/2000     Passive exposure: Current    Smokeless tobacco: Never   Vaping Use    Vaping status: Never Used   Substance and Sexual Activity    Alcohol use: Never    Drug use: Not Currently     Types: Codeine, Hydrocodone, Morphine, Oxycodone, Marijuana    Sexual activity: Defer       Social History     Social History Narrative    Social History:    Social Support: FRIENDS    Residence: living setting HOME with CHILDREN    Current Employment: [employer]    Education: 12TH GRADE    Learning Disabilities: NONE    Legal history: NONE    Hobbies/interests: READ [employer]    Samaritan: NONE    Exercise: SOME    Dietary issues: SOME    Sleep issues: SOME    Caffeine intake: SOME     history: NONE         Family History:  Family History   Problem Relation Age of Onset    Seizures Mother     Drug abuse Mother     ADD / ADHD Mother     Bipolar disorder Mother     Drug abuse Brother     Alcohol abuse Brother     ADD / ADHD Brother          Subjective      Review of Systems:   Review of Systems    PHQ-9 Score:       NATALI-7 Score:        Patient History:   The following portions of the patient's history were reviewed and updated as appropriate: allergies, current medications, past family history, past medical history, past social history, past surgical history and problem list.     Social:  Social History     Socioeconomic History    Marital status: Single    Number of children: 4    Highest education level: 12th grade   Tobacco Use    Smoking status: Every Day     Current packs/day: 0.25     Average packs/day: 0.3 packs/day for 24.4 years (6.1 ttl pk-yrs)      "Types: Cigarettes     Start date: 12/11/2000     Passive exposure: Current    Smokeless tobacco: Never   Vaping Use    Vaping status: Never Used   Substance and Sexual Activity    Alcohol use: Never    Drug use: Not Currently     Types: Codeine, Hydrocodone, Morphine, Oxycodone, Marijuana    Sexual activity: Defer       Medications:     Current Outpatient Medications:     buprenorphine-naloxone (SUBOXONE) 8-2 MG per SL tablet, Place 2 tablets under the tongue Daily for 30 days., Disp: 60 tablet, Rfl: 0    clotrimazole-betamethasone (LOTRISONE) 1-0.05 % cream, Apply 1 Application topically to the appropriate area as directed 2 (Two) Times a Day. Apply to affected area twice daily, Disp: 45 g, Rfl: 2    fexofenadine-pseudoephedrine (ALLEGRA-D 24) 180-240 MG per 24 hr tablet, Take 1 tablet by mouth Daily., Disp: 20 tablet, Rfl: 0    lamoTRIgine (LaMICtal) 200 MG tablet, Take 1 tablet by mouth 2 (Two) Times a Day., Disp: 30 tablet, Rfl: 2    lisinopril (PRINIVIL,ZESTRIL) 10 MG tablet, Take 1 tablet by mouth Daily., Disp: , Rfl:     FLUoxetine (PROzac) 20 MG tablet, Take 1 tablet by mouth Daily., Disp: 90 tablet, Rfl: 1    FLUoxetine (PROzac) 60 MG tablet, Take 1 tablet by mouth Daily., Disp: 90 tablet, Rfl: 0    Objective     Physical Exam:  Physical Exam    Vital Signs:   Vitals:    04/24/25 0836   BP: 116/67   Pulse: 82   SpO2: 94%   Weight: 81.6 kg (180 lb)   Height: 160 cm (62.99\")       Pill count: Refill due today    Body mass index is 31.89 kg/m².     MENTAL STATUS EXAM   General Appearance:  Cleanly groomed and dressed  Eye Contact:  Good eye contact  Attitude:  Cooperative  Motor Activity:  Normal gait, posture  Muscle Strength:  Normal  Speech:  Normal rate, tone, volume  Language:  Spontaneous  Hopelessness:  Denies  Loneliness: Denies  Thought Process:  Logical  Associations/ Thought Content:  No delusions  Hallucinations:  None  Suicidal Ideations:  Not present  Homicidal Ideation:  Not " present          Assessment / Plan      Diagnoses and all orders for this visit:    1. Bipolar II disorder (Primary)  -     FLUoxetine (PROzac) 20 MG tablet; Take 1 tablet by mouth Daily.  Dispense: 90 tablet; Refill: 1  -     FLUoxetine (PROzac) 60 MG tablet; Take 1 tablet by mouth Daily.  Dispense: 90 tablet; Refill: 0    2. Opioid dependence in remission  -     buprenorphine-naloxone (SUBOXONE) 8-2 MG per SL tablet; Place 2 tablets under the tongue Daily for 30 days.  Dispense: 60 tablet; Refill: 0    3. Adjustment disorder with mixed anxiety and depressed mood    4. Nicotine dependence with current use           PLAN:  Safety: No acute safety concerns  Risk Assessment: Risk of self-harm acutely is low. Risk of self-harm chronically is also low, but could be further elevated in the event of treatment noncompliance and/or AODA.    TREATMENT PLAN/GOALS: Continue supportive psychotherapy efforts and medications as indicated. Treatment and medication options discussed during today's visit. Patient acknowledged and verbally consented to continue with current treatment plan and was educated on the importance of compliance with treatment and follow-up appointments.    MEDICATION ISSUES:  SADIQ reviewed as expected.  Discussed medication options and treatment plan of prescribed medication as well as the risks, benefits, and side effects including potential falls, possible impaired driving and metabolic adversities among others. Patient is agreeable to call the office with any worsening of symptoms or onset of side effects. Patient is agreeable to call 911 or go to the nearest ER should he/she begin having SI/HI. No medication side effects or related complaints today.     Return in about 1 month (around 5/24/2025).             This document has been electronically signed by Luca Guerra MD  April 24, 2025 12:56 EDT      Part of this note may be an electronic transcription/translation of spoken language to printed  text using the Dragon Dictation System.

## 2025-04-30 ENCOUNTER — TELEPHONE (OUTPATIENT)
Age: 40
End: 2025-04-30
Payer: COMMERCIAL

## 2025-04-30 NOTE — TELEPHONE ENCOUNTER
Patient contacted MA requesting a call back from the therapist. I followed up with the patient, who expressed interest in adopting a dog from the animal shelter to support her mental health, specifically to help with symptoms of depression and anxiety. However, she reported that her landlord does not allow pets. She requested a letter of need for an emotional support animal. I informed her that I do not provide such letters, as I am not qualified to assess an animal's suitability as an emotional support animal.

## 2025-05-02 ENCOUNTER — TELEMEDICINE (OUTPATIENT)
Age: 40
End: 2025-05-02
Payer: COMMERCIAL

## 2025-05-02 DIAGNOSIS — F43.23 ADJUSTMENT DISORDER WITH MIXED ANXIETY AND DEPRESSED MOOD: ICD-10-CM

## 2025-05-02 DIAGNOSIS — F17.200 NICOTINE DEPENDENCE WITH CURRENT USE: ICD-10-CM

## 2025-05-02 DIAGNOSIS — F11.21 OPIOID DEPENDENCE IN REMISSION: Primary | ICD-10-CM
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
## 2025-05-22 DIAGNOSIS — F11.21 OPIOID DEPENDENCE IN REMISSION: ICD-10-CM

## 2025-05-22 RX ORDER — BUPRENORPHINE HYDROCHLORIDE AND NALOXONE HYDROCHLORIDE DIHYDRATE 8; 2 MG/1; MG/1
2 TABLET SUBLINGUAL DAILY
Qty: 24 TABLET | Refills: 0 | Status: SHIPPED | OUTPATIENT
Start: 2025-05-22 | End: 2025-06-21

## 2025-06-03 ENCOUNTER — OFFICE VISIT (OUTPATIENT)
Age: 40
End: 2025-06-03
Payer: COMMERCIAL

## 2025-06-03 VITALS
BODY MASS INDEX: 40.73 KG/M2 | WEIGHT: 176 LBS | OXYGEN SATURATION: 99 % | DIASTOLIC BLOOD PRESSURE: 74 MMHG | HEART RATE: 113 BPM | SYSTOLIC BLOOD PRESSURE: 122 MMHG | HEIGHT: 55 IN

## 2025-06-03 DIAGNOSIS — K02.7 DENTAL CARIES OF ROOT SURFACE: ICD-10-CM

## 2025-06-03 DIAGNOSIS — F17.200 NICOTINE DEPENDENCE WITH CURRENT USE: ICD-10-CM

## 2025-06-03 DIAGNOSIS — F31.31 BIPOLAR AFFECTIVE DISORDER, CURRENTLY DEPRESSED, MILD: ICD-10-CM

## 2025-06-03 DIAGNOSIS — F11.21 OPIOID DEPENDENCE IN REMISSION: Primary | ICD-10-CM

## 2025-06-03 DIAGNOSIS — F31.81 BIPOLAR II DISORDER: ICD-10-CM

## 2025-06-03 RX ORDER — BUPRENORPHINE HYDROCHLORIDE AND NALOXONE HYDROCHLORIDE DIHYDRATE 8; 2 MG/1; MG/1
2 TABLET SUBLINGUAL DAILY
Qty: 60 TABLET | Refills: 0 | Status: SHIPPED | OUTPATIENT
Start: 2025-06-03 | End: 2025-07-03

## 2025-06-03 RX ORDER — LAMOTRIGINE 200 MG/1
200 TABLET ORAL 2 TIMES DAILY
Qty: 30 TABLET | Refills: 2 | Status: SHIPPED | OUTPATIENT
Start: 2025-06-03 | End: 2026-06-03

## 2025-06-03 NOTE — PROGRESS NOTES
Office  Note     Patient Name: Nazia Romero  : 1985   MRN: [id number]     Referring Provider: Luca Guerra MD    Chief Complaint: Substance use    History of Present Illness:   HPI    Nazia Romero is a 39 y.o. female who is here today for follow up related to her opiate use disorder.  The patient's had significant dental pain like she has been complaining of the last couple months.  The right pain is in the right upper molar.  Her dental practice had a cancellation and offered the opening to the patient for yesterday at 1230.  The patient took the appointment but then chickened out because she was afraid of feeling that tooth coming out of her maxilla even though they would have number up with lidocaine.  She said she preferred to have some conscious sedation but that would delay the appointment 6 months.  She also admits she got not hydrocodone from her mother twice as a single tablet each time which her mother did grudgingly because her daughter was in obvious pain.  The patient felt like the hydrocodone just helped her pain and it did not trigger any other worse craving or any high but we had explained that her addictive behavior was skip in the appointment yesterday knowing that her mother will back her up with some hydrocodone when going through the dental procedures the obviously best choice for the problem.    The patient needs a refill of her lamotrigine for her bipolar disorder.  She has enough of her fluoxetine for the time being.  Her mood stability is good.  There is little evidence of her bipolar disorder acting up with any depression or bryan.    The patient did not have any body to encourage her to go through the procedure yesterday she says though her mother did offer to go with her to the appointment.  She is behind on having her therapy appointment here so we told the patient since she got her dental procedure rescheduled for  we would set her up with a therapy  "appointment on June 16 to help steal her will and go through getting that bad tooth pulled.      Triggers: Dental pain is the main trigger.    Cravings: She has had some mild cravings and relapsed twice on a single hydrocodone tablet each time which she took at bedtime to try to help her get to sleep and stay asleep.  She uses Orajel most of the time all through the day which works well but just does not last very long.    Relapse Prevention: Schedule a therapy appointment in 13 days.  We will see her back in about 4 weeks.    UDS Confirmation: Her last UDS confirmation showed that hydrocodone so we explained to the patient that that misuse of the opiate needs to stop which technically she did not agree to do as she feels like she may be required to do so.  If she waits much longer to get that tooth pulled she is going to need the hydrocodone because they will have to do oral surgery to pull the fragments out of her bone since that tooth is actively falling apart currently.    SADIQ (PDMP) Reviewed for Current/Active Medications      Past Surgical History:  Past Surgical History:   Procedure Laterality Date    HERNIA REPAIR         Problem List:  Patient Active Problem List   Diagnosis    Mild episode of recurrent major depressive disorder    NATALI (generalized anxiety disorder)    Lower extremity edema    Class 1 obesity with serious comorbidity and body mass index (BMI) of 31.0 to 31.9 in adult    Venous (peripheral) insufficiency    Venous stasis    Chronic venous hypertension with inflammation       Allergy:   Allergies   Allergen Reactions    Penicillins Hives     However she \"has taken amoxicillin as an adult without issue\"        Current Medications:   Current Outpatient Medications   Medication Sig Dispense Refill    buprenorphine-naloxone (SUBOXONE) 8-2 MG per SL tablet Place 2 tablets under the tongue Daily for 30 days. 60 tablet 0    lamoTRIgine (LaMICtal) 200 MG tablet Take 1 tablet by mouth 2 (Two) Times " a Day. 30 tablet 2    clotrimazole-betamethasone (LOTRISONE) 1-0.05 % cream Apply 1 Application topically to the appropriate area as directed 2 (Two) Times a Day. Apply to affected area twice daily 45 g 2    fexofenadine-pseudoephedrine (ALLEGRA-D 24) 180-240 MG per 24 hr tablet Take 1 tablet by mouth Daily. 20 tablet 0    FLUoxetine (PROzac) 20 MG tablet Take 1 tablet by mouth Daily. 90 tablet 1    FLUoxetine (PROzac) 60 MG tablet Take 1 tablet by mouth Daily. 90 tablet 0    lisinopril (PRINIVIL,ZESTRIL) 10 MG tablet Take 1 tablet by mouth Daily.       No current facility-administered medications for this visit.       Past Medical History:  Past Medical History:   Diagnosis Date    Anxiety     Depression     Substance abuse        Social History:  Social History     Socioeconomic History    Marital status: Single    Number of children: 4    Highest education level: 12th grade   Tobacco Use    Smoking status: Every Day     Current packs/day: 0.25     Average packs/day: 0.3 packs/day for 24.5 years (6.1 ttl pk-yrs)     Types: Cigarettes     Start date: 12/11/2000     Passive exposure: Current    Smokeless tobacco: Never   Vaping Use    Vaping status: Never Used   Substance and Sexual Activity    Alcohol use: Never    Drug use: Not Currently     Types: Codeine, Hydrocodone, Morphine, Oxycodone, Marijuana    Sexual activity: Defer       Social History     Social History Narrative    Social History:    Social Support: FRIENDS    Residence: living setting HOME with CHILDREN    Current Employment: [employer]    Education: 12TH GRADE    Learning Disabilities: NONE    Legal history: NONE    Hobbies/interests: READ RIDE BIKES3    Alevism: NONE    Exercise: SOME    Dietary issues: SOME    Sleep issues: SOME    Caffeine intake: SOME     history: NONE         Family History:  Family History   Problem Relation Age of Onset    Seizures Mother     Drug abuse Mother     ADD / ADHD Mother     Bipolar disorder Mother     Drug  abuse Brother     Alcohol abuse Brother     ADD / ADHD Brother          Subjective      Review of Systems:   Review of Systems    PHQ-9 Score:       NATALI-7 Score:        Patient History:   The following portions of the patient's history were reviewed and updated as appropriate: allergies, current medications, past family history, past medical history, past social history, past surgical history and problem list.     Social:  Social History     Socioeconomic History    Marital status: Single    Number of children: 4    Highest education level: 12th grade   Tobacco Use    Smoking status: Every Day     Current packs/day: 0.25     Average packs/day: 0.3 packs/day for 24.5 years (6.1 ttl pk-yrs)     Types: Cigarettes     Start date: 12/11/2000     Passive exposure: Current    Smokeless tobacco: Never   Vaping Use    Vaping status: Never Used   Substance and Sexual Activity    Alcohol use: Never    Drug use: Not Currently     Types: Codeine, Hydrocodone, Morphine, Oxycodone, Marijuana    Sexual activity: Defer       Medications:     Current Outpatient Medications:     buprenorphine-naloxone (SUBOXONE) 8-2 MG per SL tablet, Place 2 tablets under the tongue Daily for 30 days., Disp: 60 tablet, Rfl: 0    lamoTRIgine (LaMICtal) 200 MG tablet, Take 1 tablet by mouth 2 (Two) Times a Day., Disp: 30 tablet, Rfl: 2    clotrimazole-betamethasone (LOTRISONE) 1-0.05 % cream, Apply 1 Application topically to the appropriate area as directed 2 (Two) Times a Day. Apply to affected area twice daily, Disp: 45 g, Rfl: 2    fexofenadine-pseudoephedrine (ALLEGRA-D 24) 180-240 MG per 24 hr tablet, Take 1 tablet by mouth Daily., Disp: 20 tablet, Rfl: 0    FLUoxetine (PROzac) 20 MG tablet, Take 1 tablet by mouth Daily., Disp: 90 tablet, Rfl: 1    FLUoxetine (PROzac) 60 MG tablet, Take 1 tablet by mouth Daily., Disp: 90 tablet, Rfl: 0    lisinopril (PRINIVIL,ZESTRIL) 10 MG tablet, Take 1 tablet by mouth Daily., Disp: , Rfl:     Objective  "    Physical Exam:  Physical Exam    Vital Signs:   Vitals:    06/03/25 0834   BP: 122/74   Pulse: 113   SpO2: 99%   Weight: 79.8 kg (176 lb)   Height: 63 cm (24.8\")       Pill count: Refill due today    Body mass index is 201.21 kg/m².     MENTAL STATUS EXAM   General Appearance:  Cleanly groomed and dressed  Eye Contact:  Good eye contact  Attitude:  Cooperative  Motor Activity:  Normal gait, posture  Muscle Strength:  Normal  Speech:  Normal rate, tone, volume  Language:  Spontaneous  Mood and affect:  Normal, pleasant  Hopelessness:  Denies  Loneliness: Denies  Thought Process:  Logical  Associations/ Thought Content:  No delusions  Hallucinations:  None  Suicidal Ideations:  Not present  Homicidal Ideation:  Not present          Assessment / Plan      Diagnoses and all orders for this visit:    1. Opioid dependence in remission (Primary)  -     Behavioral Health Full Screen and Definitive Testing ([facility]) -; Future  -     buprenorphine-naloxone (SUBOXONE) 8-2 MG per SL tablet; Place 2 tablets under the tongue Daily for 30 days.  Dispense: 60 tablet; Refill: 0    2. Nicotine dependence with current use    3. Bipolar II disorder    4. Dental caries of root surface    5. Bipolar affective disorder, currently depressed, mild  -     lamoTRIgine (LaMICtal) 200 MG tablet; Take 1 tablet by mouth 2 (Two) Times a Day.  Dispense: 30 tablet; Refill: 2           PLAN:  Safety: No acute safety concerns  Risk Assessment: Risk of self-harm acutely is low. Risk of self-harm chronically is also low, but could be further elevated in the event of treatment noncompliance and/or AODA.  Continue her Orajel as an analgesic treatment for her dentalgia until she gets a tooth pulled on June 17.  Call us for any perioperative pain management issues if her dentist will not appropriately handle the issue.    TREATMENT PLAN/GOALS: Continue supportive psychotherapy efforts and medications as indicated. Treatment and medication options discussed " during today's visit. Patient acknowledged and verbally consented to continue with current treatment plan and was educated on the importance of compliance with treatment and follow-up appointments.    MEDICATION ISSUES:  SADIQ reviewed as expected.  Discussed medication options and treatment plan of prescribed medication as well as the risks, benefits, and side effects including potential falls, possible impaired driving and metabolic adversities among others. Patient is agreeable to call the office with any worsening of symptoms or onset of side effects. Patient is agreeable to call 911 or go to the nearest ER should he/she begin having SI/HI. No medication side effects or related complaints today.     Return in about 4 weeks (around 7/1/2025) for Make appt with Sharon for 6/16, Monday..             This document has been electronically signed by Luca Guerra MD  Meghna 3, 2025 09:12 EDT      Part of this note may be an electronic transcription/translation of spoken language to printed text using the Dragon Dictation System.

## 2025-06-05 LAB
6-ACETYLMORPHINE: NOT DETECTED NG/ML
ALCOHOL, ETHYL: NOT DETECTED MG/DL
AMPHETAMINE: NOT DETECTED NG/ML
BENZODIAZ BLD QL: NOT DETECTED NG/ML
BUPRENORPHINE SAL CFM-MCNC: DETECTED NG/ML
COCAINE METABOLITE: NOT DETECTED NG/ML
CODEINE: NOT DETECTED NG/ML
CREATININE: 133.1 MG/DL
EDDP SERPL QL: NOT DETECTED NG/ML
FENTANYL-EIA: NOT DETECTED NG/ML
HYDROCODONE: 898 NG/ML
HYDROMORPHONE: NOT DETECTED NG/ML
METHAMPHETAMINE: NOT DETECTED NG/ML
MORPHINE: NOT DETECTED NG/ML
NORHYDROCODONE: 2124 NG/ML
OPIATES: DETECTED NG/ML
OXIDANTS: NOT DETECTED UG/ML
OXYCODONE: NOT DETECTED NG/ML
PCP: NOT DETECTED NG/ML
PH: 6.5 (ref 4.5–9)
REF LAB TEST METHOD: NORMAL
SPECIFIC GRAVITY, QUAN: 1.02 (ref 1–1.03)
THC: NOT DETECTED NG/ML

## 2025-06-20 ENCOUNTER — PATIENT ROUNDING (BHMG ONLY) (OUTPATIENT)
Age: 40
End: 2025-06-20
Payer: COMMERCIAL

## 2025-06-20 NOTE — PROGRESS NOTES
June 20, 2025    Hello, may I speak with Nazia Romero?    My name is MARCI       I am  with [facility]  [facility]  [address]  [place] [id number] [phone].    Before we get started may I verify your date of birth? 1985    I am calling to officially welcome you to our practice and ask about your recent visit. Is this a good time to talk? No WENT TO  NOT ABLE TO     Tell me about your visit with us. What things went well?  WENT TO  NOT ABLE TO        We're always looking for ways to make our patients' experiences even better. Do you have recommendations on ways we may improve?  no WENT TO  NOT ABLE TO LM    Overall were you satisfied with your first visit to our practice? no  WENT TO  NOT ABLE TO LM  I appreciate you taking the time to speak with me today. Is there anything else I can do for you? No WENT TO  NOT ABLE TO LM      Thank you, and have a great day.

## 2025-06-24 ENCOUNTER — HOSPITAL ENCOUNTER (EMERGENCY)
Facility: HOSPITAL | Age: 40
Discharge: HOME | End: 2025-06-24
Attending: EMERGENCY MEDICINE | Admitting: EMERGENCY MEDICINE
Payer: COMMERCIAL

## 2025-06-24 ENCOUNTER — APPOINTMENT (OUTPATIENT)
Dept: ULTRASOUND IMAGING | Facility: HOSPITAL | Age: 40
End: 2025-06-24
Payer: COMMERCIAL

## 2025-06-24 VITALS
SYSTOLIC BLOOD PRESSURE: 112 MMHG | OXYGEN SATURATION: 95 % | WEIGHT: 180.78 LBS | TEMPERATURE: 98.1 F | DIASTOLIC BLOOD PRESSURE: 65 MMHG | HEART RATE: 86 BPM | RESPIRATION RATE: 18 BRPM | HEIGHT: 63 IN | BODY MASS INDEX: 32.03 KG/M2

## 2025-06-24 DIAGNOSIS — O20.0 THREATENED MISCARRIAGE IN EARLY PREGNANCY: Primary | ICD-10-CM

## 2025-06-24 DIAGNOSIS — N83.202 LEFT OVARIAN CYST: ICD-10-CM

## 2025-06-24 LAB
B-HCG UR QL: NEGATIVE
BACTERIA UR QL AUTO: ABNORMAL /HPF
BILIRUB UR QL STRIP: NEGATIVE
CLARITY UR: ABNORMAL
COLOR UR: YELLOW
GLUCOSE UR STRIP-MCNC: NEGATIVE MG/DL
HCG INTACT+B SERPL-ACNC: 41.45 MIU/ML
HGB UR QL STRIP.AUTO: ABNORMAL
HYALINE CASTS UR QL AUTO: ABNORMAL /LPF
KETONES UR QL STRIP: NEGATIVE
LEUKOCYTE ESTERASE UR QL STRIP.AUTO: ABNORMAL
MUCOUS THREADS URNS QL MICRO: ABNORMAL /HPF
NITRITE UR QL STRIP: NEGATIVE
PH UR STRIP.AUTO: 7.5 [PH] (ref 5–8)
PROT UR QL STRIP: NEGATIVE
RBC # UR STRIP: ABNORMAL /HPF
REF LAB TEST METHOD: ABNORMAL
SP GR UR STRIP: 1.02 (ref 1–1.03)
SQUAMOUS #/AREA URNS HPF: ABNORMAL /HPF
UROBILINOGEN UR QL STRIP: ABNORMAL
WBC # UR STRIP: ABNORMAL /HPF

## 2025-06-24 PROCEDURE — 81001 URINALYSIS AUTO W/SCOPE: CPT

## 2025-06-24 PROCEDURE — 99284 EMERGENCY DEPT VISIT MOD MDM: CPT

## 2025-06-24 PROCEDURE — 84702 CHORIONIC GONADOTROPIN TEST: CPT

## 2025-06-24 PROCEDURE — 81025 URINE PREGNANCY TEST: CPT | Performed by: EMERGENCY MEDICINE

## 2025-06-24 PROCEDURE — 76817 TRANSVAGINAL US OBSTETRIC: CPT

## 2025-06-24 NOTE — DISCHARGE INSTRUCTIONS
I have sent referral to OB/GYN.  Please contact her office tomorrow to schedule follow-up appointment.  Inform them that you were seen in the emergency department for threatened miscarriage and that you were bleeding.  Return on Friday to have your hCG levels rechecked to the outpatient lab.  They are open from 6 in the morning to 6 in the afternoon.  I have sent the lab request in our system electronically so you can just show up and check in.  Return to the emergency department for bleeding through pad greater than every 30 minutes, fever, passing large clots, any other new or worsening symptoms concerning to you.

## 2025-06-24 NOTE — ED PROVIDER NOTES
"Time: 12:54 PM EDT  Date of encounter:  2025  Independent Historian/Clinical History and Information was obtained by:   Patient    History is limited by: N/A    Chief Complaint: Requesting pregnancy test      History of Present Illness:  Patient is a 39 y.o. year old female who presents to the emergency department for evaluation of requesting pregnancy test.  Patient states that she has had a few positive pregnancy test at home.  She states her last menstrual period was 5 - 20 - 25.  Became concerned this morning because she started having vaginal bleeding.  She states that is not as heavy as her typical vaginal bleeding for menstrual cycle.  Patient states if she is pregnant this would be .  She had some mild vaginal discharge yesterday that discharge.  Denies any urinary symptoms.  Today is having some pelvic cramping.  Denies any nausea or vomiting.      Patient Care Team  Primary Care Provider: Luca Guerra MD    Past Medical History:     Allergies   Allergen Reactions    Penicillins Hives     However she \"has taken amoxicillin as an adult without issue\"     Past Medical History:   Diagnosis Date    Anxiety     Depression     Substance abuse      Past Surgical History:   Procedure Laterality Date    HERNIA REPAIR       Family History   Problem Relation Age of Onset    Seizures Mother     Drug abuse Mother     ADD / ADHD Mother     Bipolar disorder Mother     Drug abuse Brother     Alcohol abuse Brother     ADD / ADHD Brother        Home Medications:  Prior to Admission medications    Medication Sig Start Date End Date Taking? Authorizing Provider   buprenorphine-naloxone (SUBOXONE) 8-2 MG per SL tablet Place 2 tablets under the tongue Daily for 30 days. 6/3/25 7/3/25  Luca Guerra MD   clotrimazole-betamethasone (LOTRISONE) 1-0.05 % cream Apply 1 Application topically to the appropriate area as directed 2 (Two) Times a Day. Apply to affected area twice daily 3/12/25   Dex Justin DPM " "  fexofenadine-pseudoephedrine (ALLEGRA-D 24) 180-240 MG per 24 hr tablet Take 1 tablet by mouth Daily. 11/19/24   Yehuda Neville DO   FLUoxetine (PROzac) 20 MG tablet Take 1 tablet by mouth Daily. 4/24/25   Luca Guerra MD   FLUoxetine (PROzac) 60 MG tablet Take 1 tablet by mouth Daily. 4/24/25   Luca Guerra MD   lamoTRIgine (LaMICtal) 200 MG tablet Take 1 tablet by mouth 2 (Two) Times a Day. 6/3/25 6/3/26  Luca Guerra MD   lisinopril (PRINIVIL,ZESTRIL) 10 MG tablet Take 1 tablet by mouth Daily.    Provider, Desi, MD        Social History:   Social History     Tobacco Use    Smoking status: Every Day     Current packs/day: 0.25     Average packs/day: 0.3 packs/day for 24.5 years (6.1 ttl pk-yrs)     Types: Cigarettes     Start date: 12/11/2000     Passive exposure: Current    Smokeless tobacco: Never   Vaping Use    Vaping status: Never Used   Substance Use Topics    Alcohol use: Never    Drug use: Not Currently     Types: Codeine, Hydrocodone, Morphine, Oxycodone, Marijuana         Review of Systems:  Review of Systems   Constitutional:  Negative for fever.   Gastrointestinal:  Negative for nausea and vomiting.   Genitourinary:  Positive for pelvic pain, vaginal bleeding and vaginal discharge (Resolved). Negative for dysuria and frequency.   All other systems reviewed and are negative.       Physical Exam:  /65 (BP Location: Right arm, Patient Position: Sitting)   Pulse 86   Temp 98.1 °F (36.7 °C) (Oral)   Resp 18   Ht 160 cm (63\")   Wt 82 kg (180 lb 12.4 oz)   LMP 05/20/2025 (Exact Date)   SpO2 95%   BMI 32.02 kg/m²     Physical Exam  Vitals and nursing note reviewed.   Constitutional:       Appearance: Normal appearance.   HENT:      Head: Normocephalic and atraumatic.      Nose: Nose normal.   Eyes:      Extraocular Movements: Extraocular movements intact.      Conjunctiva/sclera: Conjunctivae normal.   Cardiovascular:      Rate and Rhythm: Normal rate and regular " rhythm.      Heart sounds: Normal heart sounds.   Pulmonary:      Effort: Pulmonary effort is normal.      Breath sounds: Normal breath sounds.   Abdominal:      General: Abdomen is flat. There is no distension.      Palpations: Abdomen is soft. There is no mass.      Tenderness: There is no abdominal tenderness. There is no guarding or rebound.      Hernia: No hernia is present.   Musculoskeletal:         General: Normal range of motion.      Cervical back: Normal range of motion and neck supple.   Skin:     General: Skin is warm and dry.   Neurological:      General: No focal deficit present.      Mental Status: She is alert and oriented to person, place, and time.   Psychiatric:         Mood and Affect: Mood normal.         Behavior: Behavior normal.         Thought Content: Thought content normal.         Judgment: Judgment normal.                  Medical Decision Making:    Comorbidities that affect care:    Substance abuse, anxiety, depression    External Notes reviewed:    Previous Clinic Note: Patient last seen by psychiatry on 6/30-25      The following orders were placed and all results were independently analyzed by me:  Orders Placed This Encounter   Procedures    US Ob Transvaginal    Pregnancy, Urine - Urine, Clean Catch    hCG, Quantitative, Pregnancy    Urinalysis With Microscopic If Indicated (No Culture) - Urine, Clean Catch    Urinalysis, Microscopic Only - Urine, Clean Catch    hCG, Quantitative, Pregnancy    Ambulatory Referral to Obstetrics / Gynecology       Medications Given in the Emergency Department:  Medications - No data to display     ED Course:    ED Course as of 06/24/25 1523   Tue Jun 24, 2025   1236 Went to evaluate patient.  They are not currently in room. [MD]      ED Course User Index  [MD] Satish Rahman PA-C       Labs:    Lab Results (last 24 hours)       Procedure Component Value Units Date/Time    Pregnancy, Urine - Urine, Clean Catch [[id number]]  (Normal) Collected:  06/24/25 1221    Specimen: Urine, Clean Catch Updated: 06/24/25 1229     HCG, Urine QL Negative    Narrative:      Sensitive immunoassays may demonstrate false positive results  with specimens containing heterophilic antibodies. Assays may  also exhibit false-positive or false-negative results with  specimens containing human anti-mouse antibodies. These   specimens may come from patients receving preparations of  mouse monoclonal antibodies for diagnosis or therapy or having  been exposed to mice. If the qualitative interpretation is  inconsistent with the clinical evaluation, results should be   confirmed by an alternate hCG method, ie. quantitative hCG.  As with all urine pregnancy test, this test does not reliably  detect hCG degradation products, including free-beta hCG and  beta core fragments.    Urinalysis With Microscopic If Indicated (No Culture) - Urine, Clean Catch [[id number]]  (Abnormal) Collected: 06/24/25 1221    Specimen: Urine, Clean Catch Updated: 06/24/25 1347     Color, UA Yellow     Appearance, UA Cloudy     pH, UA 7.5     Specific Gravity, UA 1.016     Glucose, UA Negative     Ketones, UA Negative     Bilirubin, UA Negative     Blood, UA Moderate (2+)     Protein, UA Negative     Leuk Esterase, UA Small (1+)     Nitrite, UA Negative     Urobilinogen, UA 1.0 E.U./dL    Urinalysis, Microscopic Only - Urine, Clean Catch [[id number]]  (Abnormal) Collected: 06/24/25 1221    Specimen: Urine, Clean Catch Updated: 06/24/25 1400     RBC, UA 0-2 /HPF      WBC, UA 3-5 /HPF      Bacteria, UA Trace /HPF      Squamous Epithelial Cells, UA 0-2 /HPF      Hyaline Casts, UA 0-2 /LPF      Mucus, UA Trace /HPF      Methodology Manual Light Microscopy    hCG, Quantitative, Pregnancy [[id number]] Collected: 06/24/25 1229    Specimen: Blood Updated: 06/24/25 1308     HCG Quantitative 41.45 mIU/mL     Narrative:      HCG Ranges by Gestational Age    Females - non-pregnant premenopausal   </= 1mIU/mL HCG  Females -  postmenopausal               </= 7mIU/mL HCG    3 Weeks       5.4   -      72 mIU/mL  4 Weeks      10.2   -     708 mIU/mL  5 Weeks       217   -   8,245 mIU/mL  6 Weeks       152   -  32,177 mIU/mL  7 Weeks     4,059   - 153,767 mIU/mL  8 Weeks    31,366   - 149,094 mIU/mL  9 Weeks    59,109   - 135,901 mIU/mL  10 Weeks   44,186   - 170,409 mIU/mL  12 Weeks   27,107   - 201,615 mIU/mL  14 Weeks   24,302   -  93,646 mIU/mL  15 Weeks   12,540   -  69,747 mIU/mL  16 Weeks    8,904   -  55,332 mIU/mL  17 Weeks    8,240   -  51,793 mIU/mL  18 Weeks    9,649   -  55,271 mIU/mL               Imaging:    US Ob Transvaginal  Result Date: 6/24/2025  US OB TRANSVAGINAL Date of Exam: 6/24/2025 1:43 PM EDT Indication: pelvic pain and vaginal bleeding. Comparison: No comparisons available. Technique: Transvaginal ultrasound was performed to evaluate pregnancy.  Real time scanning was performed with multiple image documentation per protocol.  Findings: The uterus measures 8.6 x 4.1 x 4.6 cm and appears normal. No intrauterine gestation is identified. The endometrial stripe thickness measures 12.5 mm. The right ovary measures 2.9 x 2.5 x 1.5 cm and appears normal. The left ovary measures 3.3 x 1.9 x 3.2  cm and contains a 1.9 cm cyst, which could be functional or physiologic. Normal flow is seen within both ovaries.     Impression: 1.No intrauterine gestation identified. 2.Uterus and right adnexa appear within normal limits. 3.1.9 cm left adnexal cyst, which could be functional or physiologic. Electronically Signed: Luca Nixon MD  6/24/2025 2:52 PM EDT  Workstation ID: [id number]        Differential Diagnosis and Discussion:    Vaginal Bleeding: Differential diagnosis includes but is not limited to foreign body, tumor, vaginitis, dysfunctional uterine bleeding, endocrine abnormalities, coagulation disorder, systemic illness, polyps, complications of pregnancy (possible ectopic pregnancy).    PROCEDURES:    Labs were collected  in the emergency department and all labs were reviewed and interpreted by me.  Ultrasound was performed in the emergency department and the ultrasound impression was interpreted by me.     No orders to display       Procedures    MDM  Number of Diagnoses or Management Options  Left ovarian cyst  Threatened miscarriage in early pregnancy  Diagnosis management comments: Patient presented to the emergency department today for vaginal bleeding after having pregnancy test that was positive at home.  Urine pregnancy test was negative but quantitative hCG was 41.45.  Due to patient having associated bleeding and pelvic cramping proceeded with transvaginal ultrasound which noted normal uterus and ovaries other than small left ovarian cyst.  Urinalysis was negative for any acute infection.  Referral was sent to OB/GYN for threatened miscarriage as patient does not have OB/GYN to follow-up with.  Will have patient have repeat hCG 3 days from now.  Return to the emergency department guidelines provided.       Amount and/or Complexity of Data Reviewed  Clinical lab tests: reviewed and ordered  Tests in the radiology section of CPT®: reviewed and ordered    Risk of Complications, Morbidity, and/or Mortality  Presenting problems: moderate  Diagnostic procedures: low  Management options: low    Patient Progress  Patient progress: stable       Patient Care Considerations:    ANTIBIOTICS: I considered prescribing antibiotics as an outpatient however no bacterial focus of infection was found.      Consultants/Shared Management Plan:    SHARED VISIT: I have discussed the case with my supervising physician, Dr. Chiang who states agrees with workup. The substantive portion of the medical decision was made by the attesting physician who made or approve the management plan and will take responsibility for the patient.  Clinical findings were discussed and ultimate disposition was made in consult with supervising physician.    Social  Determinants of Health:    Patient is independent, reliable, and has access to care.       Disposition and Care Coordination:    Discharged: The patient is suitable and stable for discharge with no need for consideration of admission.    I have explained the patient´s condition, diagnoses and treatment plan based on the information available to me at this time. I have answered questions and addressed any concerns. The patient has a good  understanding of the patient´s diagnosis, condition, and treatment plan as can be expected at this point. The vital signs have been stable. The patient´s condition is stable and appropriate for discharge from the emergency department.      The patient will pursue further outpatient evaluation with the primary care physician or other designated or consulting physician as outlined in the discharge instructions. They are agreeable to this plan of care and follow-up instructions have been explained in detail. The patient has received these instructions in written format and has expressed an understanding of the discharge instructions. The patient is aware that any significant change in condition or worsening of symptoms should prompt an immediate return to this or the closest emergency department or call to 911.  I have explained discharge medications and the need for follow up with the patient/caretakers. This was also printed in the discharge instructions. Patient was discharged with the following medications and follow up:      Medication List      No changes were made to your prescriptions during this visit.      [facility]  [address] [place] [address]  [phone]  Schedule an appointment as soon as possible for a visit          Final diagnoses:   Threatened miscarriage in early pregnancy   Left ovarian cyst        ED Disposition       ED Disposition   Discharge    Condition   Stable    Comment   --               This medical record  created using voice recognition software.             Satish Rahman PA-C  06/24/25 5817

## 2025-06-24 NOTE — ED PROVIDER NOTES
"SHARED VISIT ATTESTATION:    This visit was performed by myself and an APC.  I personally approved the management plan/medical decision making and take responsibility for the patient management.      SHARED VISIT NOTE:    Patient is 39 y.o. year old female that presents to the ED for evaluation of desire for pregnancy test..  The patient is having pelvic pain and vaginal bleeding.        ED Course:    /65 (BP Location: Right arm, Patient Position: Sitting)   Pulse 86   Temp 98.1 °F (36.7 °C) (Oral)   Resp 18   Ht 160 cm (63\")   Wt 82 kg (180 lb 12.4 oz)   LMP 05/20/2025 (Exact Date)   SpO2 95%   BMI 32.02 kg/m²       The following orders were placed and all results were independently analyzed by me:  Orders Placed This Encounter   Procedures    US Ob Transvaginal    Pregnancy, Urine - Urine, Clean Catch    hCG, Quantitative, Pregnancy    Urinalysis With Microscopic If Indicated (No Culture) - Urine, Clean Catch    Urinalysis, Microscopic Only - Urine, Clean Catch    hCG, Quantitative, Pregnancy    Ambulatory Referral to Obstetrics / Gynecology       Medications Given in the Emergency Department:  Medications - No data to display     ED Course:    ED Course as of 06/24/25 1837 Tue Jun 24, 2025   1236 Went to evaluate patient.  They are not currently in room. [MD]      ED Course User Index  [MD] Satish Rahman PA-C       Labs:    Lab Results (last 24 hours)       Procedure Component Value Units Date/Time    Pregnancy, Urine - Urine, Clean Catch [[id number]]  (Normal) Collected: 06/24/25 1221    Specimen: Urine, Clean Catch Updated: 06/24/25 1229     HCG, Urine QL Negative    Narrative:      Sensitive immunoassays may demonstrate false positive results  with specimens containing heterophilic antibodies. Assays may  also exhibit false-positive or false-negative results with  specimens containing human anti-mouse antibodies. These   specimens may come from patients receving preparations of  mouse " monoclonal antibodies for diagnosis or therapy or having  been exposed to mice. If the qualitative interpretation is  inconsistent with the clinical evaluation, results should be   confirmed by an alternate hCG method, ie. quantitative hCG.  As with all urine pregnancy test, this test does not reliably  detect hCG degradation products, including free-beta hCG and  beta core fragments.    Urinalysis With Microscopic If Indicated (No Culture) - Urine, Clean Catch [[id number]]  (Abnormal) Collected: 06/24/25 1221    Specimen: Urine, Clean Catch Updated: 06/24/25 1347     Color, UA Yellow     Appearance, UA Cloudy     pH, UA 7.5     Specific Gravity, UA 1.016     Glucose, UA Negative     Ketones, UA Negative     Bilirubin, UA Negative     Blood, UA Moderate (2+)     Protein, UA Negative     Leuk Esterase, UA Small (1+)     Nitrite, UA Negative     Urobilinogen, UA 1.0 E.U./dL    Urinalysis, Microscopic Only - Urine, Clean Catch [[id number]]  (Abnormal) Collected: 06/24/25 1221    Specimen: Urine, Clean Catch Updated: 06/24/25 1400     RBC, UA 0-2 /HPF      WBC, UA 3-5 /HPF      Bacteria, UA Trace /HPF      Squamous Epithelial Cells, UA 0-2 /HPF      Hyaline Casts, UA 0-2 /LPF      Mucus, UA Trace /HPF      Methodology Manual Light Microscopy    hCG, Quantitative, Pregnancy [[id number]] Collected: 06/24/25 1229    Specimen: Blood Updated: 06/24/25 1308     HCG Quantitative 41.45 mIU/mL     Narrative:      HCG Ranges by Gestational Age    Females - non-pregnant premenopausal   </= 1mIU/mL HCG  Females - postmenopausal               </= 7mIU/mL HCG    3 Weeks       5.4   -      72 mIU/mL  4 Weeks      10.2   -     708 mIU/mL  5 Weeks       217   -   8,245 mIU/mL  6 Weeks       152   -  32,177 mIU/mL  7 Weeks     4,059   - 153,767 mIU/mL  8 Weeks    31,366   - 149,094 mIU/mL  9 Weeks    59,109   - 135,901 mIU/mL  10 Weeks   44,186   - 170,409 mIU/mL  12 Weeks   27,107   - 201,615 mIU/mL  14 Weeks   24,302   -  93,646  mIU/mL  15 Weeks   12,540   -  69,747 mIU/mL  16 Weeks    8,904   -  55,332 mIU/mL  17 Weeks    8,240   -  51,793 mIU/mL  18 Weeks    9,649   -  55,271 mIU/mL               Imaging:    US Ob Transvaginal  Result Date: 6/24/2025  US OB TRANSVAGINAL Date of Exam: 6/24/2025 1:43 PM EDT Indication: pelvic pain and vaginal bleeding. Comparison: No comparisons available. Technique: Transvaginal ultrasound was performed to evaluate pregnancy.  Real time scanning was performed with multiple image documentation per protocol.  Findings: The uterus measures 8.6 x 4.1 x 4.6 cm and appears normal. No intrauterine gestation is identified. The endometrial stripe thickness measures 12.5 mm. The right ovary measures 2.9 x 2.5 x 1.5 cm and appears normal. The left ovary measures 3.3 x 1.9 x 3.2  cm and contains a 1.9 cm cyst, which could be functional or physiologic. Normal flow is seen within both ovaries.     Impression: 1.No intrauterine gestation identified. 2.Uterus and right adnexa appear within normal limits. 3.1.9 cm left adnexal cyst, which could be functional or physiologic. Electronically Signed: Luca Nixon MD  6/24/2025 2:52 PM EDT  Workstation ID: [id number]      MDM:    Procedures    Labs were collected in the emergency department and all labs were reviewed and interpreted by me.  Ultrasound was performed in the emergency department and the ultrasound impression was interpreted by me.                      Darius Chiang DO  18:37 EDT  06/24/25         Darius Chiang DO  06/24/25 [id number]

## 2025-06-27 ENCOUNTER — LAB (OUTPATIENT)
Facility: HOSPITAL | Age: 40
End: 2025-06-27
Payer: COMMERCIAL

## 2025-06-27 DIAGNOSIS — O20.0 THREATENED MISCARRIAGE IN EARLY PREGNANCY: ICD-10-CM

## 2025-06-27 LAB — HCG INTACT+B SERPL-ACNC: 12.7 MIU/ML

## 2025-06-27 PROCEDURE — 36415 COLL VENOUS BLD VENIPUNCTURE: CPT

## 2025-06-27 PROCEDURE — 84702 CHORIONIC GONADOTROPIN TEST: CPT

## 2025-07-01 ENCOUNTER — OFFICE VISIT (OUTPATIENT)
Age: 40
End: 2025-07-01
Payer: COMMERCIAL

## 2025-07-01 VITALS
HEART RATE: 93 BPM | SYSTOLIC BLOOD PRESSURE: 127 MMHG | HEIGHT: 63 IN | WEIGHT: 180 LBS | OXYGEN SATURATION: 97 % | DIASTOLIC BLOOD PRESSURE: 70 MMHG | BODY MASS INDEX: 31.89 KG/M2

## 2025-07-01 DIAGNOSIS — F11.21 OPIOID DEPENDENCE IN REMISSION: Primary | ICD-10-CM

## 2025-07-01 DIAGNOSIS — F31.60 BIPOLAR AFFECTIVE DISORDER, MIXED: ICD-10-CM

## 2025-07-01 DIAGNOSIS — O03.9 MISCARRIAGE: ICD-10-CM

## 2025-07-01 DIAGNOSIS — F31.81 BIPOLAR II DISORDER: ICD-10-CM

## 2025-07-01 DIAGNOSIS — F17.200 NICOTINE DEPENDENCE WITH CURRENT USE: ICD-10-CM

## 2025-07-01 RX ORDER — FLUOXETINE HYDROCHLORIDE 60 MG/1
60 TABLET, FILM COATED ORAL; ORAL DAILY
Qty: 90 TABLET | Refills: 0 | Status: SHIPPED | OUTPATIENT
Start: 2025-07-01

## 2025-07-01 RX ORDER — BUPRENORPHINE HYDROCHLORIDE AND NALOXONE HYDROCHLORIDE DIHYDRATE 8; 2 MG/1; MG/1
2 TABLET SUBLINGUAL DAILY
Qty: 60 TABLET | Refills: 0 | Status: SHIPPED | OUTPATIENT
Start: 2025-07-01 | End: 2025-07-31

## 2025-07-01 RX ORDER — ARIPIPRAZOLE 5 MG/1
5 TABLET ORAL DAILY
Qty: 30 TABLET | Refills: 2 | Status: SHIPPED | OUTPATIENT
Start: 2025-07-01

## 2025-07-01 NOTE — PROGRESS NOTES
"     Office  Note     Patient Name: Nazia Romero  : 1985   MRN: [id number]     Referring Provider: Luca Guerra MD    Chief Complaint: Substance use    History of Present Illness:   HPI    Nazia Romero is a 39 y.o. female who is here today for follow up related to her opiate use disorder.  She is taking her Suboxone 2 tablets a day every day to good effect.  She is having no triggering that she is aware of for drug abuse.  She is not having any craving to overuse any opiates including her Suboxone.    Her major problem is her depression is gradually creeping up on her which is probably a result of her bipolar polar disorder depression.  She has been on Prozac for quite a few years now.  We just raised her fluoxetine to the maximum dose over the winter 6 months ago and its not helping.  She was actually expressing some suicidal ideation to her father in the sense that she said \"I just do not want to be here anymore.  I do not want to do this anymore.\"    He talked her off the luggage effectively.  Her recovery is actually stronger she has been more proactive if she has been more consistent.  She has had better mood stability since she has been sober these last few years but her bipolar disorder is just getting worse due to the bipolar depression which is at this time more than a manage for her lamotrigine and fluoxetine.    She went to the emergency room with the miscarriage she just sustained a 1 week ago as a result of her pelvic cramping and vaginal bleeding.  She had just missed a period and was about 4 to 5 weeks pregnant.  Fortunately she did not need any surgery but she still has a GYN appointment coming up which is probably superfluous.      Triggers: No triggers    Cravings: She only craves her nicotine on a daily basis which is why she is still smoking cigarettes scattered through the day.    Relapse Prevention: The patient has been negligent in coming to her therapy appointments she has " "missed the last 2 appointments which were 2 and 4 weeks ago.  She acknowledges that she is being highly reactive.  She is not working on being proactive.  She admits she needs to follow through to complete tasks such as coming to a therapy appointment after she makes the therapy appointment.  She was alarmed to hear that she is on the cusp of being terminated from the program because of her no-show rate.  We need to help make a medication change to improve her depression so that she can be more proactive and constructive plan to be her plan Sida back of her plan a on a regular basis.    UDS Confirmation: Her last UDS had hydrocodone and it but that was 3 months ago.    SADIQ (PDMP) Reviewed for Current/Active Medications      Past Surgical History:  Past Surgical History:   Procedure Laterality Date    HERNIA REPAIR         Problem List:  Patient Active Problem List   Diagnosis    Mild episode of recurrent major depressive disorder    NATALI (generalized anxiety disorder)    Lower extremity edema    Class 1 obesity with serious comorbidity and body mass index (BMI) of 31.0 to 31.9 in adult    Venous (peripheral) insufficiency    Venous stasis    Chronic venous hypertension with inflammation       Allergy:   Allergies   Allergen Reactions    Penicillins Hives     However she \"has taken amoxicillin as an adult without issue\"        Current Medications:   Current Outpatient Medications   Medication Sig Dispense Refill    buprenorphine-naloxone (SUBOXONE) 8-2 MG per SL tablet Place 2 tablets under the tongue Daily for 30 days. 60 tablet 0    FLUoxetine (PROzac) 60 MG tablet Take 1 tablet by mouth Daily. 90 tablet 0    ARIPiprazole (ABILIFY) 5 MG tablet Take 1 tablet by mouth Daily. 30 tablet 2    clotrimazole-betamethasone (LOTRISONE) 1-0.05 % cream Apply 1 Application topically to the appropriate area as directed 2 (Two) Times a Day. Apply to affected area twice daily 45 g 2    fexofenadine-pseudoephedrine (ALLEGRA-D 24) " 180-240 MG per 24 hr tablet Take 1 tablet by mouth Daily. 20 tablet 0    lamoTRIgine (LaMICtal) 200 MG tablet Take 1 tablet by mouth 2 (Two) Times a Day. 30 tablet 2    lisinopril (PRINIVIL,ZESTRIL) 10 MG tablet Take 1 tablet by mouth Daily.       No current facility-administered medications for this visit.       Past Medical History:  Past Medical History:   Diagnosis Date    Anxiety     Depression     Substance abuse        Social History:  Social History     Socioeconomic History    Marital status: Single    Number of children: 4    Highest education level: 12th grade   Tobacco Use    Smoking status: Every Day     Current packs/day: 0.25     Average packs/day: 0.3 packs/day for 24.6 years (6.1 ttl pk-yrs)     Types: Cigarettes     Start date: 12/11/2000     Passive exposure: Current    Smokeless tobacco: Never   Vaping Use    Vaping status: Never Used   Substance and Sexual Activity    Alcohol use: Never    Drug use: Not Currently     Types: Codeine, Hydrocodone, Morphine, Oxycodone, Marijuana    Sexual activity: Yes     Partners: Male     Birth control/protection: None       Social History     Social History Narrative    Social History:    Social Support: FRIENDS    Residence: living setting HOME with CHILDREN    Current Employment: [employer]    Education: 12TH GRADE    Learning Disabilities: NONE    Legal history: NONE    Hobbies/interests: READ [email or web address]    Yazdanism: NONE    Exercise: SOME    Dietary issues: SOME    Sleep issues: SOME    Caffeine intake: SOME     history: NONE         Family History:  Family History   Problem Relation Age of Onset    Seizures Mother     Drug abuse Mother     ADD / ADHD Mother     Bipolar disorder Mother     Drug abuse Brother     Alcohol abuse Brother     ADD / ADHD Brother          Subjective      Review of Systems:   Review of Systems    PHQ-9 Score:       NATALI-7 Score:        Patient History:   The following portions of the patient's history were reviewed and updated  "as appropriate: allergies, current medications, past family history, past medical history, past social history, past surgical history and problem list.     Social:  Social History     Socioeconomic History    Marital status: Single    Number of children: 4    Highest education level: 12th grade   Tobacco Use    Smoking status: Every Day     Current packs/day: 0.25     Average packs/day: 0.3 packs/day for 24.6 years (6.1 ttl pk-yrs)     Types: Cigarettes     Start date: 12/11/2000     Passive exposure: Current    Smokeless tobacco: Never   Vaping Use    Vaping status: Never Used   Substance and Sexual Activity    Alcohol use: Never    Drug use: Not Currently     Types: Codeine, Hydrocodone, Morphine, Oxycodone, Marijuana    Sexual activity: Yes     Partners: Male     Birth control/protection: None       Medications:     Current Outpatient Medications:     buprenorphine-naloxone (SUBOXONE) 8-2 MG per SL tablet, Place 2 tablets under the tongue Daily for 30 days., Disp: 60 tablet, Rfl: 0    FLUoxetine (PROzac) 60 MG tablet, Take 1 tablet by mouth Daily., Disp: 90 tablet, Rfl: 0    ARIPiprazole (ABILIFY) 5 MG tablet, Take 1 tablet by mouth Daily., Disp: 30 tablet, Rfl: 2    clotrimazole-betamethasone (LOTRISONE) 1-0.05 % cream, Apply 1 Application topically to the appropriate area as directed 2 (Two) Times a Day. Apply to affected area twice daily, Disp: 45 g, Rfl: 2    fexofenadine-pseudoephedrine (ALLEGRA-D 24) 180-240 MG per 24 hr tablet, Take 1 tablet by mouth Daily., Disp: 20 tablet, Rfl: 0    lamoTRIgine (LaMICtal) 200 MG tablet, Take 1 tablet by mouth 2 (Two) Times a Day., Disp: 30 tablet, Rfl: 2    lisinopril (PRINIVIL,ZESTRIL) 10 MG tablet, Take 1 tablet by mouth Daily., Disp: , Rfl:     Objective     Physical Exam:  Physical Exam    Vital Signs:   Vitals:    07/01/25 0942   BP: 127/70   Pulse: 93   SpO2: 97%   Weight: 81.6 kg (180 lb)   Height: 160 cm (63\")       Pill count: Did not bring    Body mass index is " 31.89 kg/m².     MENTAL STATUS EXAM   General Appearance:  Cleanly groomed and dressed  Eye Contact:  Good eye contact  Attitude:  Cooperative  Motor Activity:  Normal gait, posture  Muscle Strength:  Normal  Speech:  Normal rate, tone, volume  Language:  Spontaneous  Mood and affect:  Normal, pleasant, frustrated, flat and dysthymic  Hopelessness:  Denies  Loneliness: Denies  Thought Process:  Logical  Associations/ Thought Content:  No delusions  Hallucinations:  None  Suicidal Ideations:  Not present, specific thoughts but denies intent and no access to means  Homicidal Ideation:  Not present          Assessment / Plan      Diagnoses and all orders for this visit:    1. Opioid dependence in remission (Primary)  -     buprenorphine-naloxone (SUBOXONE) 8-2 MG per SL tablet; Place 2 tablets under the tongue Daily for 30 days.  Dispense: 60 tablet; Refill: 0    2. Nicotine dependence with current use    3. Bipolar II disorder  -     FLUoxetine (PROzac) 60 MG tablet; Take 1 tablet by mouth Daily.  Dispense: 90 tablet; Refill: 0    4. Bipolar affective disorder, mixed  -     ARIPiprazole (ABILIFY) 5 MG tablet; Take 1 tablet by mouth Daily.  Dispense: 30 tablet; Refill: 2    5. Miscarriage           PLAN:  Safety: No acute safety concerns  Risk Assessment: Risk of self-harm acutely is low. Risk of self-harm chronically is also low, but could be further elevated in the event of treatment noncompliance and/or AODA.  The patient will go to the GYN appointment even though her pelvic cramping and vaginal bleeding is stopped.  We will refill her fluoxetine at the 60 mg dose each day so that is a reduction from the 80 mg but we will add the 5 mg of Abilify at 2 that is an augmenter for her bipolar depression.  She will come back in 5 weeks to see if we need to increase the Abilify or not.  She will continue the Lamictal throughout this period of time.    TREATMENT PLAN/GOALS: Continue supportive psychotherapy efforts and  medications as indicated. Treatment and medication options discussed during today's visit. Patient acknowledged and verbally consented to continue with current treatment plan and was educated on the importance of compliance with treatment and follow-up appointments.    MEDICATION ISSUES:  SADIQ reviewed as expected.  Discussed medication options and treatment plan of prescribed medication as well as the risks, benefits, and side effects including potential falls, possible impaired driving and metabolic adversities among others. Patient is agreeable to call the office with any worsening of symptoms or onset of side effects. Patient is agreeable to call 911 or go to the nearest ER should he/she begin having SI/HI. No medication side effects or related complaints today.     Return in about 5 weeks (around 8/5/2025).             This document has been electronically signed by Luca Guerra MD  July 1, 2025 10:39 EDT      Part of this note may be an electronic transcription/translation of spoken language to printed text using the Dragon Dictation System.

## 2025-07-10 ENCOUNTER — OFFICE VISIT (OUTPATIENT)
Age: 40
End: 2025-07-10
Payer: COMMERCIAL

## 2025-07-10 VITALS
DIASTOLIC BLOOD PRESSURE: 76 MMHG | HEIGHT: 63 IN | WEIGHT: 178.3 LBS | BODY MASS INDEX: 31.59 KG/M2 | SYSTOLIC BLOOD PRESSURE: 108 MMHG

## 2025-07-10 DIAGNOSIS — N89.8 VAGINAL ODOR: ICD-10-CM

## 2025-07-10 DIAGNOSIS — Z11.3 SCREENING EXAMINATION FOR STD (SEXUALLY TRANSMITTED DISEASE): ICD-10-CM

## 2025-07-10 DIAGNOSIS — N92.6 MISSED MENSES: ICD-10-CM

## 2025-07-10 DIAGNOSIS — O03.9 MISCARRIAGE: Primary | ICD-10-CM

## 2025-07-10 LAB
HBV SURFACE AG SERPL QL IA: NORMAL
HCV AB SER QL: NORMAL
HIV 1+2 AB+HIV1 P24 AG SERPL QL IA: NORMAL

## 2025-07-10 PROCEDURE — 87798 DETECT AGENT NOS DNA AMP: CPT

## 2025-07-10 PROCEDURE — 87340 HEPATITIS B SURFACE AG IA: CPT

## 2025-07-10 PROCEDURE — 84702 CHORIONIC GONADOTROPIN TEST: CPT

## 2025-07-10 PROCEDURE — 87661 TRICHOMONAS VAGINALIS AMPLIF: CPT

## 2025-07-10 PROCEDURE — 86803 HEPATITIS C AB TEST: CPT

## 2025-07-10 PROCEDURE — 87801 DETECT AGNT MULT DNA AMPLI: CPT

## 2025-07-10 PROCEDURE — 87491 CHLMYD TRACH DNA AMP PROBE: CPT

## 2025-07-10 PROCEDURE — G0432 EIA HIV-1/HIV-2 SCREEN: HCPCS

## 2025-07-10 PROCEDURE — 87591 N.GONORRHOEAE DNA AMP PROB: CPT

## 2025-07-10 PROCEDURE — 86592 SYPHILIS TEST NON-TREP QUAL: CPT

## 2025-07-10 RX ORDER — ETONOGESTREL 68 MG/1
1 IMPLANT SUBCUTANEOUS ONCE
Qty: 1 EACH | Refills: 0 | Status: SHIPPED | OUTPATIENT
Start: 2025-07-10 | End: 2025-07-11

## 2025-07-10 RX ORDER — METRONIDAZOLE 7.5 MG/G
1 GEL VAGINAL NIGHTLY
Qty: 70 G | Refills: 0 | Status: SHIPPED | OUTPATIENT
Start: 2025-07-10 | End: 2025-07-15

## 2025-07-10 NOTE — PROGRESS NOTES
"Subjective     Chief Complaint   Patient presents with    Gynecologic Exam     New GYN, Pt here today for SAB, U/S today, Pt reports no current bleeding, Pt reports vaginal odor       Nazia Romero is a 39 y.o.  whose LMP is Patient's last menstrual period was 2025 (exact date). presents with follow up from ER  Lmp was 25 had positive pregnancy test at home and then had vaginal bleeding 25 went to ER US there was with no iup   Bleeding lasted about a week  Hcg was 41 and then dropped to 12  She reports vaginal odor and would like sti testing partner has been unfaithful   She would like to start a form of bc as well  ED with Darius Chiang DO (2025)     The following portions of the patient's history were reviewed and updated as appropriate:vital signs, allergies, current medications, past medical history, past social history, past surgical history, and problem list    Patient reports that she is not currently experiencing any symptoms of urinary incontinence.       Review of Systems   Pertinent items are noted in HPI.     Objective      /76   Ht 160 cm (63\")   Wt 80.9 kg (178 lb 4.8 oz)   LMP 2025 (Exact Date)   Breastfeeding No   BMI 31.58 kg/m²     Physical Exam  Exam conducted with a chaperone present.   Constitutional:       General: She is not in acute distress.  Pulmonary:      Effort: Pulmonary effort is normal.   Abdominal:      Palpations: Abdomen is soft.      Tenderness: There is no abdominal tenderness.   Genitourinary:     General: Normal vulva.      Exam position: Lithotomy position.      Vagina: Vaginal discharge present.      Cervix: Normal.      Uterus: Normal.       Adnexa: Right adnexa normal and left adnexa normal.   Skin:     General: Skin is warm.   Neurological:      Mental Status: She is alert.   Psychiatric:         Mood and Affect: Mood normal.         Thought Content: Thought content normal.         Judgment: Judgment normal. "         General:   alert   Heart: Not performed today   Lungs: Not performed today.   Breast: Not performed today   Neck: Not performed today   Abdomen: Not performed today   CVA: Not performed today   Pelvis: Vulva and vagina appear normal. Bimanual exam reveals normal uterus and adnexa. No cmt Thin white discharge   Extremities: Extremities normal, atraumatic, no cyanosis or edema   Neurologic: AOx3. Gait normal. Reflexes and motor strength normal and symmetric. Cranial nerves 2-12 and sensation grossly intact.   Psychiatric: Normal affect, judgement, and mood       Lab Review   Labs: hCG, Quantitative, Pregnancy (06/24/2025 12:29)  hCG, Quantitative, Pregnancy (06/27/2025 16:39)    Imaging   US Ob Transvaginal (06/24/2025 14:18)   US Ob Transvaginal (07/10/2025 11:26)     Assessment & Plan     ASSESSMENT  1. Miscarriage    2. Screening examination for STD (sexually transmitted disease)    3. Vaginal odor    4. Missed menses          PLAN  1.   Orders Placed This Encounter   Procedures    NuSwab VG+ - Swab, Vagina    US Ob Transvaginal    RPR, Rfx Qn RPR / Confirm TP    Hepatitis B Surface Antigen    Hepatitis C Antibody    HIV-1 / O / 2 Ag / Antibody 4th Generation    HCG, B-subunit, Quantitative       2. Medications prescribed this encounter:        New Medications Ordered This Visit   Medications    metroNIDAZOLE (METROGEL) 0.75 % vaginal gel     Sig: Insert 1 Applicatorful into the vagina Every Night for 5 days.     Dispense:  70 g     Refill:  0    Etonogestrel (Nexplanon) 68 MG implant subdermal implant     Sig: To be inserted one time by prescriber. Route Subdermal.     Dispense:  1 each     Refill:  0       3. Discussed complete miscarriage today will recheck hcg PT in office Is negative  Nuswab and sti testing completed today  Treated for bv based on exam and s/s she would like to try metrogel vs pills  Discussed using this for 5 nights and avoid alcohol   Birth control counseling discussed all options  reviewed  Ultimately decided on nexplanon  She will return for nexplanon insertion   I spent 45 minutes caring for Nazia Romero on this date of service. This time includes time spent by me in the following activities: preparing for the visit, reviewing tests, obtaining and/or reviewing a separately obtained history, performing a medically appropriate examination and/or evaluation, counseling and educating the patient/family/caregiver, ordering medications, tests, or procedures and documenting information in the medical record.      Follow up: 1 week    Caroline Lorenzo, APRN  7/10/2025

## 2025-07-12 LAB
HCG INTACT+B SERPL-ACNC: <1 MIU/ML
OBSERVATION IMP: NORMAL
RPR SER QL: NON REACTIVE

## 2025-07-14 LAB
A VAGINAE DNA VAG QL NAA+PROBE: ABNORMAL SCORE
BVAB2 DNA VAG QL NAA+PROBE: ABNORMAL SCORE
C ALBICANS DNA VAG QL NAA+PROBE: NEGATIVE
C GLABRATA DNA VAG QL NAA+PROBE: NEGATIVE
C TRACH DNA SPEC QL NAA+PROBE: NEGATIVE
MEGA1 DNA VAG QL NAA+PROBE: ABNORMAL SCORE
N GONORRHOEA DNA VAG QL NAA+PROBE: NEGATIVE
T VAGINALIS DNA VAG QL NAA+PROBE: POSITIVE

## 2025-07-15 ENCOUNTER — OFFICE VISIT (OUTPATIENT)
Age: 40
End: 2025-07-15
Payer: COMMERCIAL

## 2025-07-15 DIAGNOSIS — F11.21 OPIOID DEPENDENCE IN REMISSION: Primary | ICD-10-CM

## 2025-07-15 DIAGNOSIS — F43.23 ADJUSTMENT DISORDER WITH MIXED ANXIETY AND DEPRESSED MOOD: ICD-10-CM

## 2025-07-15 DIAGNOSIS — A59.01 TRICHOMONAL VAGINITIS: Primary | ICD-10-CM

## 2025-07-15 DIAGNOSIS — F31.31 BIPOLAR AFFECTIVE DISORDER, CURRENTLY DEPRESSED, MILD: ICD-10-CM

## 2025-07-15 DIAGNOSIS — F17.200 NICOTINE DEPENDENCE WITH CURRENT USE: ICD-10-CM

## 2025-07-15 DIAGNOSIS — F11.21 OPIOID DEPENDENCE IN REMISSION: ICD-10-CM

## 2025-07-15 RX ORDER — METRONIDAZOLE 500 MG/1
500 TABLET ORAL 2 TIMES DAILY
Qty: 14 TABLET | Refills: 0 | Status: SHIPPED | OUTPATIENT
Start: 2025-07-15 | End: 2025-07-22

## 2025-07-15 RX ORDER — BUPRENORPHINE HYDROCHLORIDE AND NALOXONE HYDROCHLORIDE DIHYDRATE 8; 2 MG/1; MG/1
2 TABLET SUBLINGUAL DAILY
Qty: 10 TABLET | Refills: 0 | Status: SHIPPED | OUTPATIENT
Start: 2025-07-15 | End: 2025-07-20
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
## 2025-07-15 NOTE — TELEPHONE ENCOUNTER
PT NEEDS REFILL FOR FOUR DAYS WORTH OF MEDICATION AS PROVIDER IS ON VACATION AND PT NEXT APPOINTMENT IS SCHEDULED WHEN PROVIDER RETURNS PLEASE ADVISE

## 2025-08-05 ENCOUNTER — OFFICE VISIT (OUTPATIENT)
Age: 40
End: 2025-08-05
Payer: COMMERCIAL

## 2025-08-05 VITALS
HEART RATE: 82 BPM | OXYGEN SATURATION: 97 % | DIASTOLIC BLOOD PRESSURE: 78 MMHG | SYSTOLIC BLOOD PRESSURE: 123 MMHG | BODY MASS INDEX: 31.54 KG/M2 | HEIGHT: 63 IN | WEIGHT: 178 LBS

## 2025-08-05 DIAGNOSIS — F11.21 OPIOID DEPENDENCE IN REMISSION: ICD-10-CM

## 2025-08-05 DIAGNOSIS — F17.200 NICOTINE DEPENDENCE WITH CURRENT USE: Primary | ICD-10-CM

## 2025-08-05 DIAGNOSIS — F31.31 BIPOLAR AFFECTIVE DISORDER, CURRENTLY DEPRESSED, MILD: ICD-10-CM

## 2025-08-05 RX ORDER — LAMOTRIGINE 200 MG/1
200 TABLET ORAL 2 TIMES DAILY
Qty: 30 TABLET | Refills: 2 | Status: SHIPPED | OUTPATIENT
Start: 2025-08-05 | End: 2026-08-05

## 2025-08-05 RX ORDER — BUPRENORPHINE HYDROCHLORIDE AND NALOXONE HYDROCHLORIDE DIHYDRATE 8; 2 MG/1; MG/1
1 TABLET SUBLINGUAL DAILY
Qty: 30 TABLET | Refills: 0 | Status: SHIPPED | OUTPATIENT
Start: 2025-08-05 | End: 2025-08-06

## 2025-08-06 ENCOUNTER — OFFICE VISIT (OUTPATIENT)
Age: 40
End: 2025-08-06
Payer: COMMERCIAL

## 2025-08-06 VITALS
HEIGHT: 63 IN | BODY MASS INDEX: 31.8 KG/M2 | WEIGHT: 179.5 LBS | DIASTOLIC BLOOD PRESSURE: 74 MMHG | SYSTOLIC BLOOD PRESSURE: 122 MMHG

## 2025-08-06 DIAGNOSIS — Z30.017 NEXPLANON INSERTION: Primary | ICD-10-CM

## 2025-08-06 DIAGNOSIS — F11.21 OPIOID DEPENDENCE IN REMISSION: ICD-10-CM

## 2025-08-06 LAB
B-HCG UR QL: NEGATIVE
EXPIRATION DATE: NORMAL
INTERNAL NEGATIVE CONTROL: NEGATIVE
INTERNAL POSITIVE CONTROL: POSITIVE
Lab: NORMAL

## 2025-08-06 RX ORDER — BUPRENORPHINE HYDROCHLORIDE AND NALOXONE HYDROCHLORIDE DIHYDRATE 8; 2 MG/1; MG/1
2 TABLET SUBLINGUAL DAILY
Qty: 30 TABLET | Refills: 0 | Status: SHIPPED | OUTPATIENT
Start: 2025-08-06 | End: 2025-08-06 | Stop reason: SDUPTHER

## 2025-08-06 RX ORDER — BUPRENORPHINE HYDROCHLORIDE AND NALOXONE HYDROCHLORIDE DIHYDRATE 8; 2 MG/1; MG/1
2 TABLET SUBLINGUAL DAILY
Qty: 30 TABLET | Refills: 0 | Status: SHIPPED | OUTPATIENT
Start: 2025-08-06

## 2025-08-14 ENCOUNTER — TELEMEDICINE (OUTPATIENT)
Age: 40
End: 2025-08-14
Payer: COMMERCIAL

## 2025-08-14 DIAGNOSIS — F31.81 BIPOLAR II DISORDER: ICD-10-CM

## 2025-08-14 DIAGNOSIS — F11.21 OPIOID DEPENDENCE IN REMISSION: Primary | ICD-10-CM

## 2025-08-14 DIAGNOSIS — F17.200 NICOTINE DEPENDENCE WITH CURRENT USE: ICD-10-CM

## 2025-08-15 ENCOUNTER — TELEPHONE (OUTPATIENT)
Age: 40
End: 2025-08-15
Payer: COMMERCIAL

## 2025-08-18 ENCOUNTER — PATIENT ROUNDING (BHMG ONLY) (OUTPATIENT)
Age: 40
End: 2025-08-18
Payer: COMMERCIAL